# Patient Record
Sex: MALE | Race: WHITE | NOT HISPANIC OR LATINO | URBAN - METROPOLITAN AREA
[De-identification: names, ages, dates, MRNs, and addresses within clinical notes are randomized per-mention and may not be internally consistent; named-entity substitution may affect disease eponyms.]

---

## 2019-12-24 ENCOUNTER — OFFICE VISIT - HEALTHEAST (OUTPATIENT)
Dept: FAMILY MEDICINE | Facility: CLINIC | Age: 34
End: 2019-12-24

## 2019-12-24 DIAGNOSIS — Z00.00 ROUTINE GENERAL MEDICAL EXAMINATION AT A HEALTH CARE FACILITY: ICD-10-CM

## 2019-12-24 DIAGNOSIS — Z13.1 DIABETES MELLITUS SCREENING: ICD-10-CM

## 2019-12-24 DIAGNOSIS — E66.09 CLASS 2 OBESITY DUE TO EXCESS CALORIES WITHOUT SERIOUS COMORBIDITY WITH BODY MASS INDEX (BMI) OF 35.0 TO 35.9 IN ADULT: ICD-10-CM

## 2019-12-24 DIAGNOSIS — G44.82 ORGASMIC HEADACHE: ICD-10-CM

## 2019-12-24 DIAGNOSIS — L21.9 SEBORRHEIC DERMATITIS OF SCALP: ICD-10-CM

## 2019-12-24 DIAGNOSIS — Z13.220 LIPID SCREENING: ICD-10-CM

## 2019-12-24 DIAGNOSIS — E66.812 CLASS 2 OBESITY DUE TO EXCESS CALORIES WITHOUT SERIOUS COMORBIDITY WITH BODY MASS INDEX (BMI) OF 35.0 TO 35.9 IN ADULT: ICD-10-CM

## 2019-12-24 LAB
CHOLEST SERPL-MCNC: 197 MG/DL
FASTING STATUS PATIENT QL REPORTED: YES
FASTING STATUS PATIENT QL REPORTED: YES
GLUCOSE BLD-MCNC: 77 MG/DL (ref 70–125)
HDLC SERPL-MCNC: 39 MG/DL
LDLC SERPL CALC-MCNC: 129 MG/DL
TRIGL SERPL-MCNC: 146 MG/DL

## 2019-12-24 ASSESSMENT — MIFFLIN-ST. JEOR: SCORE: 2039.51

## 2020-01-22 ENCOUNTER — RECORDS - HEALTHEAST (OUTPATIENT)
Dept: ADMINISTRATIVE | Facility: OTHER | Age: 35
End: 2020-01-22

## 2020-01-30 ENCOUNTER — RECORDS - HEALTHEAST (OUTPATIENT)
Dept: ADMINISTRATIVE | Facility: OTHER | Age: 35
End: 2020-01-30

## 2020-01-31 ENCOUNTER — RECORDS - HEALTHEAST (OUTPATIENT)
Dept: ADMINISTRATIVE | Facility: OTHER | Age: 35
End: 2020-01-31

## 2020-02-24 ENCOUNTER — RECORDS - HEALTHEAST (OUTPATIENT)
Dept: ADMINISTRATIVE | Facility: OTHER | Age: 35
End: 2020-02-24

## 2020-04-21 ENCOUNTER — RECORDS - HEALTHEAST (OUTPATIENT)
Dept: ADMINISTRATIVE | Facility: OTHER | Age: 35
End: 2020-04-21

## 2020-08-25 ENCOUNTER — COMMUNICATION - HEALTHEAST (OUTPATIENT)
Dept: FAMILY MEDICINE | Facility: CLINIC | Age: 35
End: 2020-08-25

## 2020-08-27 ENCOUNTER — OFFICE VISIT - HEALTHEAST (OUTPATIENT)
Dept: FAMILY MEDICINE | Facility: CLINIC | Age: 35
End: 2020-08-27

## 2020-08-27 ENCOUNTER — RECORDS - HEALTHEAST (OUTPATIENT)
Dept: GENERAL RADIOLOGY | Facility: CLINIC | Age: 35
End: 2020-08-27

## 2020-08-27 DIAGNOSIS — M79.672 PAIN OF LEFT HEEL: ICD-10-CM

## 2020-08-27 DIAGNOSIS — E66.09 CLASS 1 OBESITY DUE TO EXCESS CALORIES WITHOUT SERIOUS COMORBIDITY WITH BODY MASS INDEX (BMI) OF 34.0 TO 34.9 IN ADULT: ICD-10-CM

## 2020-08-27 DIAGNOSIS — E66.811 CLASS 1 OBESITY DUE TO EXCESS CALORIES WITHOUT SERIOUS COMORBIDITY WITH BODY MASS INDEX (BMI) OF 34.0 TO 34.9 IN ADULT: ICD-10-CM

## 2020-08-27 DIAGNOSIS — M79.672 PAIN IN LEFT FOOT: ICD-10-CM

## 2020-08-27 RX ORDER — KETOCONAZOLE 20 MG/ML
SHAMPOO TOPICAL
Status: SHIPPED | COMMUNITY
Start: 2020-03-28 | End: 2022-05-19

## 2020-11-10 ENCOUNTER — COMMUNICATION - HEALTHEAST (OUTPATIENT)
Dept: FAMILY MEDICINE | Facility: CLINIC | Age: 35
End: 2020-11-10

## 2020-11-13 ENCOUNTER — OFFICE VISIT - HEALTHEAST (OUTPATIENT)
Dept: FAMILY MEDICINE | Facility: CLINIC | Age: 35
End: 2020-11-13

## 2020-11-13 DIAGNOSIS — R19.7 DIARRHEA, UNSPECIFIED TYPE: ICD-10-CM

## 2020-11-13 DIAGNOSIS — G44.82 ORGASMIC HEADACHE: ICD-10-CM

## 2020-11-13 DIAGNOSIS — Z20.822 SUSPECTED COVID-19 VIRUS INFECTION: ICD-10-CM

## 2020-11-13 DIAGNOSIS — M79.10 MYALGIA: ICD-10-CM

## 2020-11-13 RX ORDER — SUMATRIPTAN 25 MG/1
25 TABLET, FILM COATED ORAL
Qty: 10 TABLET | Refills: 3 | Status: SHIPPED | OUTPATIENT
Start: 2020-11-13 | End: 2022-05-19

## 2020-11-14 ENCOUNTER — AMBULATORY - HEALTHEAST (OUTPATIENT)
Dept: FAMILY MEDICINE | Facility: CLINIC | Age: 35
End: 2020-11-14

## 2020-11-14 DIAGNOSIS — Z20.822 SUSPECTED COVID-19 VIRUS INFECTION: ICD-10-CM

## 2020-11-14 DIAGNOSIS — M79.10 MYALGIA: ICD-10-CM

## 2020-11-14 DIAGNOSIS — R19.7 DIARRHEA, UNSPECIFIED TYPE: ICD-10-CM

## 2020-11-16 ENCOUNTER — COMMUNICATION - HEALTHEAST (OUTPATIENT)
Dept: SCHEDULING | Facility: CLINIC | Age: 35
End: 2020-11-16

## 2021-04-18 ENCOUNTER — COMMUNICATION - HEALTHEAST (OUTPATIENT)
Dept: FAMILY MEDICINE | Facility: CLINIC | Age: 36
End: 2021-04-18

## 2021-04-20 ENCOUNTER — RECORDS - HEALTHEAST (OUTPATIENT)
Dept: GENERAL RADIOLOGY | Facility: CLINIC | Age: 36
End: 2021-04-20

## 2021-04-20 ENCOUNTER — OFFICE VISIT - HEALTHEAST (OUTPATIENT)
Dept: FAMILY MEDICINE | Facility: CLINIC | Age: 36
End: 2021-04-20

## 2021-04-20 DIAGNOSIS — R00.2 PALPITATIONS: ICD-10-CM

## 2021-04-20 DIAGNOSIS — R06.02 SHORTNESS OF BREATH: ICD-10-CM

## 2021-04-20 DIAGNOSIS — R03.0 ELEVATED BLOOD PRESSURE READING: ICD-10-CM

## 2021-04-20 DIAGNOSIS — J30.9 ALLERGIC RHINITIS, UNSPECIFIED SEASONALITY, UNSPECIFIED TRIGGER: ICD-10-CM

## 2021-04-20 LAB
ANION GAP SERPL CALCULATED.3IONS-SCNC: 11 MMOL/L (ref 5–18)
BUN SERPL-MCNC: 10 MG/DL (ref 8–22)
CALCIUM SERPL-MCNC: 9.5 MG/DL (ref 8.5–10.5)
CHLORIDE BLD-SCNC: 105 MMOL/L (ref 98–107)
CO2 SERPL-SCNC: 24 MMOL/L (ref 22–31)
CREAT SERPL-MCNC: 0.99 MG/DL (ref 0.7–1.3)
D DIMER PPP FEU-MCNC: <=0.27 FEU UG/ML
ERYTHROCYTE [DISTWIDTH] IN BLOOD BY AUTOMATED COUNT: 12.6 % (ref 11–14.5)
GFR SERPL CREATININE-BSD FRML MDRD: >60 ML/MIN/1.73M2
GLUCOSE BLD-MCNC: 83 MG/DL (ref 70–125)
HCT VFR BLD AUTO: 42.4 % (ref 40–54)
HGB BLD-MCNC: 14.4 G/DL (ref 14–18)
MCH RBC QN AUTO: 30.3 PG (ref 27–34)
MCHC RBC AUTO-ENTMCNC: 34 G/DL (ref 32–36)
MCV RBC AUTO: 89 FL (ref 80–100)
PLATELET # BLD AUTO: 353 THOU/UL (ref 140–440)
PMV BLD AUTO: 8.9 FL (ref 7–10)
POTASSIUM BLD-SCNC: 4.3 MMOL/L (ref 3.5–5)
RBC # BLD AUTO: 4.76 MILL/UL (ref 4.4–6.2)
SODIUM SERPL-SCNC: 140 MMOL/L (ref 136–145)
TSH SERPL DL<=0.005 MIU/L-ACNC: 0.88 UIU/ML (ref 0.3–5)
WBC: 10.2 THOU/UL (ref 4–11)

## 2021-04-21 LAB
ATRIAL RATE - MUSE: 58 BPM
DIASTOLIC BLOOD PRESSURE - MUSE: NORMAL
INTERPRETATION ECG - MUSE: NORMAL
P AXIS - MUSE: 41 DEGREES
PR INTERVAL - MUSE: 168 MS
QRS DURATION - MUSE: 86 MS
QT - MUSE: 396 MS
QTC - MUSE: 388 MS
R AXIS - MUSE: 34 DEGREES
SYSTOLIC BLOOD PRESSURE - MUSE: NORMAL
T AXIS - MUSE: 24 DEGREES
VENTRICULAR RATE- MUSE: 58 BPM

## 2021-04-28 ENCOUNTER — OFFICE VISIT - HEALTHEAST (OUTPATIENT)
Dept: CARDIOLOGY | Facility: CLINIC | Age: 36
End: 2021-04-28

## 2021-04-28 DIAGNOSIS — R00.2 PALPITATIONS: ICD-10-CM

## 2021-04-28 ASSESSMENT — MIFFLIN-ST. JEOR: SCORE: 2024.88

## 2021-05-11 ENCOUNTER — OFFICE VISIT - HEALTHEAST (OUTPATIENT)
Dept: ALLERGY | Facility: CLINIC | Age: 36
End: 2021-05-11

## 2021-05-11 DIAGNOSIS — J30.81 ALLERGIC RHINITIS DUE TO ANIMALS: ICD-10-CM

## 2021-05-11 DIAGNOSIS — R10.84 ABDOMINAL PAIN, GENERALIZED: ICD-10-CM

## 2021-05-11 DIAGNOSIS — J30.1 SEASONAL ALLERGIC RHINITIS DUE TO POLLEN: ICD-10-CM

## 2021-05-11 ASSESSMENT — MIFFLIN-ST. JEOR: SCORE: 2024.88

## 2021-05-12 LAB
CAT DANDER IGG QN: <0.35 KU/L
DOG DANDER+EPITH IGE QN: <0.35 KU/L

## 2021-05-14 LAB
GLIADIN IGA SER-ACNC: 1.3 U/ML
GLIADIN IGG SER-ACNC: <0.4 U/ML
IGA SERPL-MCNC: 168 MG/DL (ref 65–400)
TTG IGA SER-ACNC: 0.3 U/ML
TTG IGG SER-ACNC: <0.6 U/ML

## 2021-05-24 ENCOUNTER — HOSPITAL ENCOUNTER (OUTPATIENT)
Dept: CARDIOLOGY | Facility: CLINIC | Age: 36
Discharge: HOME | End: 2021-05-24
Attending: INTERNAL MEDICINE

## 2021-05-24 LAB
AORTIC ROOT: 2.9 CM
AORTIC VALVE MEAN VELOCITY: 95.7 CM/S
ASCENDING AORTA: 2.8 CM
AV DIMENSIONLESS INDEX VTI: 0.8
AV MEAN GRADIENT: 4 MMHG
AV PEAK GRADIENT: 9.5 MMHG
AV VALVE AREA: 3.3 CM2
AV VELOCITY RATIO: 0.8
BSA FOR ECHO PROCEDURE: 2.32 M2
CV BLOOD PRESSURE: ABNORMAL MMHG
CV ECHO HEIGHT: 70 IN
CV ECHO WEIGHT: 240 LBS
DOP CALC AO PEAK VEL: 154 CM/S
DOP CALC AO VTI: 30 CM
DOP CALC LVOT AREA: 4.15 CM2
DOP CALC LVOT DIAMETER: 2.3 CM
DOP CALC LVOT PEAK VEL: 118 CM/S
DOP CALC LVOT STROKE VOLUME: 98.8 CM3
DOP CALCLVOT PEAK VEL VTI: 23.8 CM
EJECTION FRACTION: 72 % (ref 55–75)
FRACTIONAL SHORTENING: 39.8 % (ref 28–44)
INTERVENTRICULAR SEPTUM IN END DIASTOLE: 1.14 CM (ref 0.6–1)
IVS/PW RATIO: 1.3
LA AREA 1: 23.2 CM2
LA AREA 2: 23.7 CM2
LEFT ATRIUM LENGTH: 5.48 CM
LEFT ATRIUM SIZE: 3.1 CM
LEFT ATRIUM VOLUME INDEX: 36.8 ML/M2
LEFT ATRIUM VOLUME: 85.3 ML
LEFT VENTRICLE CARDIAC INDEX: 3.1 L/MIN/M2
LEFT VENTRICLE CARDIAC OUTPUT: 7.2 L/MIN
LEFT VENTRICLE DIASTOLIC VOLUME INDEX: 59.5 CM3/M2 (ref 34–74)
LEFT VENTRICLE DIASTOLIC VOLUME: 138 CM3 (ref 62–150)
LEFT VENTRICLE HEART RATE: 73 BPM
LEFT VENTRICLE MASS INDEX: 49 G/M2
LEFT VENTRICLE SYSTOLIC VOLUME INDEX: 16.4 CM3/M2 (ref 11–31)
LEFT VENTRICLE SYSTOLIC VOLUME: 38 CM3 (ref 21–61)
LEFT VENTRICULAR INTERNAL DIMENSION IN DIASTOLE: 3.74 CM (ref 4.2–5.8)
LEFT VENTRICULAR INTERNAL DIMENSION IN SYSTOLE: 2.25 CM (ref 2.5–4)
LEFT VENTRICULAR MASS: 113.8 G
LEFT VENTRICULAR OUTFLOW TRACT MEAN GRADIENT: 3 MMHG
LEFT VENTRICULAR OUTFLOW TRACT MEAN VELOCITY: 80 CM/S
LEFT VENTRICULAR OUTFLOW TRACT PEAK GRADIENT: 6 MMHG
LEFT VENTRICULAR POSTERIOR WALL IN END DIASTOLE: 0.85 CM (ref 0.6–1)
LV STROKE VOLUME INDEX: 42.6 ML/M2
MITRAL VALVE E/A RATIO: 1.8
MV AVERAGE E/E' RATIO: 7.5 CM/S
MV DECELERATION TIME: 211 MS
MV E'TISSUE VEL-LAT: 14.5 CM/S
MV E'TISSUE VEL-MED: 12.6 CM/S
MV LATERAL E/E' RATIO: 7
MV MEDIAL E/E' RATIO: 8.1
MV PEAK A VELOCITY: 55.3 CM/S
MV PEAK E VELOCITY: 102 CM/S
NUC REST DIASTOLIC VOLUME INDEX: 3840 LBS
NUC REST SYSTOLIC VOLUME INDEX: 70 IN
PV ACCELERATION TIME: 127 MS
TRICUSPID REGURGITATION PEAK PRESSURE GRADIENT: 19.9 MMHG
TRICUSPID VALVE ANULAR PLANE SYSTOLIC EXCURSION: 3.1 CM
TRICUSPID VALVE PEAK REGURGITANT VELOCITY: 223 CM/S

## 2021-05-24 ASSESSMENT — MIFFLIN-ST. JEOR: SCORE: 2024.88

## 2021-05-27 VITALS — HEART RATE: 72 BPM | OXYGEN SATURATION: 98 % | BODY MASS INDEX: 34.36 KG/M2 | HEIGHT: 70 IN | WEIGHT: 240 LBS

## 2021-06-04 VITALS
SYSTOLIC BLOOD PRESSURE: 130 MMHG | HEART RATE: 85 BPM | DIASTOLIC BLOOD PRESSURE: 80 MMHG | BODY MASS INDEX: 34.92 KG/M2 | TEMPERATURE: 98.2 F | WEIGHT: 243.38 LBS

## 2021-06-04 VITALS
SYSTOLIC BLOOD PRESSURE: 136 MMHG | BODY MASS INDEX: 34.95 KG/M2 | WEIGHT: 244.1 LBS | HEART RATE: 68 BPM | OXYGEN SATURATION: 97 % | DIASTOLIC BLOOD PRESSURE: 76 MMHG | HEIGHT: 70 IN

## 2021-06-04 NOTE — PROGRESS NOTES
Assessment and Plan:     1. Routine general medical examination at a health care facility  Discussed consuming a healthy diet and exercising.  Provided influenza and Td vaccines.  - Td, Preservative Free (green label)    2. Diabetes mellitus screening  - Glucose    3. Lipid screening  - Lipid Cascade    4. Class 2 obesity due to excess calories without serious comorbidity with body mass index (BMI) of 35.0 to 35.9 in adult  The following high BMI interventions were performed this visit: encouragement to exercise and lifestyle education regarding diet    5. Orgasmic headache  Patient had this evaluated in the emergency room in February.  He was told that he had a normal MRI.  Unfortunately, I do not have these records.  We will try to obtain records.  Will start sumatriptan to use as needed for possible migraine headaches.  Educated on its indications and side effects.  Will refer to neurology for further evaluation if symptoms persist.  - SUMAtriptan (IMITREX) 25 MG tablet; Take 1 tablet (25 mg total) by mouth every 2 (two) hours as needed for migraine.  Dispense: 10 tablet; Refill: 3  - Ambulatory referral to Neurology    6. Seborrheic dermatitis of scalp  We will treat with ketoconazole shampoo.  If no improvement symptoms, may consider referral to dermatology.  He is content with the plan.  - ketoconazole (NIZORAL) 2 % shampoo; Apply to damp skin, lather, leave on 5 minutes, and rinse  Dispense: 120 mL; Refill: 3        Subjective:     Ishmael is a 34 y.o. male presenting to the clinic for a male physical.  Patient has been  since October.  He has 2 children ages 11 and 9.  He has no concerns for STDs.  He is trying to consume a healthy diet.  He walks for exercise.  He works within security.  Patient has numerous concerns today.  Since February, he has had headaches after having an orgasm.  Patient states after his first episode, he developed left-sided facial numbness.  He presented to the emergency  room where he was diagnosed with a migraine headache.  He feels as though the left side of his face has been less sensitive since.  He did have an MRI at that time.  Unfortunately, I do not have these records available.  Patient states the headache has a gradual onset after having an orgasm.  He feels an ache behind his left eye which radiates to his left temple region.  He denies photophobia, nausea, vomiting, numbness and tingling of his extremities, muscular weakness.  He does take Aleve with some relief.  Secondly, patient is concerned dry scalp.  He has had this since adolescence.  He has tried numerous over-the-counter shampoos including head and shoulders and Selsun Blue. He has tried a T-cell shampoo with salicylic acid.  The dryness continues to be generalized.    Review of Systems: A complete 14 point review of systems was obtained and is negative or as stated in the history of present illness.    Social History     Socioeconomic History     Marital status:      Spouse name: Not on file     Number of children: Not on file     Years of education: Not on file     Highest education level: Not on file   Occupational History     Not on file   Social Needs     Financial resource strain: Not on file     Food insecurity:     Worry: Not on file     Inability: Not on file     Transportation needs:     Medical: Not on file     Non-medical: Not on file   Tobacco Use     Smoking status: Never Smoker     Smokeless tobacco: Never Used   Substance and Sexual Activity     Alcohol use: Not on file     Drug use: Not on file     Sexual activity: Not on file   Lifestyle     Physical activity:     Days per week: Not on file     Minutes per session: Not on file     Stress: Not on file   Relationships     Social connections:     Talks on phone: Not on file     Gets together: Not on file     Attends Buddhism service: Not on file     Active member of club or organization: Not on file     Attends meetings of clubs or  "organizations: Not on file     Relationship status: Not on file     Intimate partner violence:     Fear of current or ex partner: Not on file     Emotionally abused: Not on file     Physically abused: Not on file     Forced sexual activity: Not on file   Other Topics Concern     Not on file   Social History Narrative     Not on file       Active Ambulatory Problems     Diagnosis Date Noted     No Active Ambulatory Problems     Resolved Ambulatory Problems     Diagnosis Date Noted     No Resolved Ambulatory Problems     No Additional Past Medical History       No family history on file.    Objective:     /76   Pulse 68   Ht 5' 9.75\" (1.772 m)   Wt (!) 244 lb 1.6 oz (110.7 kg)   SpO2 97%   BMI 35.28 kg/m      General Appearance:    Alert, cooperative, no distress, appears stated age   Head:    Normocephalic, without obvious abnormality, atraumatic   Eyes:    PERRL, conjunctiva/corneas clear, EOM's intact, fundi     benign, both eyes        Ears:    Normal TM's and external ear canals, both ears   Nose:   Nares normal, septum midline, mucosa normal, no drainage    or sinus tenderness   Throat:   Lips, mucosa, and tongue normal; teeth and gums normal   Neck:   Supple, symmetrical, trachea midline, no adenopathy;        thyroid:  No enlargement/tenderness/nodules; no carotid    bruit or JVD   Back:     Symmetric, no curvature, ROM normal, no CVA tenderness   Lungs:     Clear to auscultation bilaterally, respirations unlabored   Chest wall:    No tenderness or deformity   Heart:    Regular rate and rhythm, S1 and S2 normal, no murmur, rub    or gallop   Abdomen:     Soft, non-tender, bowel sounds active all four quadrants,     no masses, no organomegaly   Genitalia:    Normal male without lesion, discharge or tenderness       Extremities:   Extremities normal, atraumatic, no cyanosis or edema   Pulses:   2+ and symmetric all extremities   Skin:   His scalp has some areas of redness within the occipital region.  " He has some generalized flaky skin of the scalp.    Lymph nodes:   Cervical, supraclavicular, and axillary nodes normal   Neurologic:   CNII-XII intact. Normal strength, sensation and reflexes       throughout

## 2021-06-05 VITALS
DIASTOLIC BLOOD PRESSURE: 90 MMHG | BODY MASS INDEX: 34.49 KG/M2 | OXYGEN SATURATION: 96 % | WEIGHT: 240.4 LBS | HEART RATE: 67 BPM | SYSTOLIC BLOOD PRESSURE: 130 MMHG

## 2021-06-05 VITALS
SYSTOLIC BLOOD PRESSURE: 118 MMHG | DIASTOLIC BLOOD PRESSURE: 84 MMHG | HEIGHT: 70 IN | HEART RATE: 64 BPM | RESPIRATION RATE: 14 BRPM | WEIGHT: 240 LBS | BODY MASS INDEX: 34.36 KG/M2

## 2021-06-10 NOTE — PROGRESS NOTES
"Assessment/Plan:    1. Pain of left heel  Acute pain in L heel, no injury/trauma. Pain is significant, point tenderness on exam. Recommend further evaluation with xray and pt agrees - per mine and radiologist read no evidence of acute fracture or other pathology to cause symptoms. We discussed the following for management: wear supportive shoe at all times, ice to area, tylenol 1000mg three times a day and/or ibuprofen 600mg three times a day with meals as needed, follow up as needed.   - XR Foot Left 3 or More VWS; Future    2. Class 1 obesity due to excess calories without serious comorbidity with body mass index (BMI) of 34.0 to 34.9 in adult  BMI 34.92 today. Discussed healthy diet and regular exercise for weight loss.      Follow up: as needed    Missy Frazier MD  [facility]    Subjective:    Patient ID: Ishmael Petersen is a 35 y.o. male is here today for left heel pain    Left heel pain  -2 Saturdays ago he came home from work (works with [employer] at [place], on feet and moving around a lot) and felt like had \"deep bruise\" (had similar sx in the past from standing on hard surface) - didn't think much of it  -the next day it worsened and more painful  -couldn't think of any particular injury  -had a few days off and took it easy but continued to feel worse  -can't walk on heel - walking toe point   -if hits on things then it hurts really bad, almost \"takes him out\"  -\"feels like stepping on nail\"  -no skin changes or redness  -feels swollen but no visual changes  -no prior injury to this area  -hasn't taken anything for pain  -better at rest but worse with weight bearing  -called in sick on Sunday 8/23  -pain with putting on work shoes; sandals feel better than barefoot and work shoes feel better (built in insoles and Dr Zarate gel)  -reports hx injury in 8th grade and had surgery - L knee, chronic pain and arthritis      Past Medical History:   Diagnosis Date     Arthritis      Depression "      Sleep apnea      Past Surgical History:   Procedure Laterality Date     KNEE ARTHROSCOPY Left      VASECTOMY       Current Outpatient Medications on File Prior to Visit   Medication Sig Dispense Refill     cetirizine 10 mg TbDL Take by mouth.       ketoconazole (NIZORAL) 2 % shampoo APPLY TO DAMP SKIN, LATHER, LEAVE ON 5 MINUTES, AND RINSE       loratadine-pseudoephedrine (LORATADINE-PSEUDOEPHEDRINE) 5-120 mg Tb12 Take 1 tablet by mouth every 12 (twelve) hours.       SUMAtriptan (IMITREX) 25 MG tablet Take 1 tablet (25 mg total) by mouth every 2 (two) hours as needed for migraine. 10 tablet 3     No current facility-administered medications on file prior to visit.      No Known Allergies  Social History     Socioeconomic History     Marital status:      Spouse name: Not on file     Number of children: Not on file     Years of education: Not on file     Highest education level: Not on file   Occupational History     Not on file   Social Needs     Financial resource strain: Not on file     Food insecurity     Worry: Not on file     Inability: Not on file     Transportation needs     Medical: Not on file     Non-medical: Not on file   Tobacco Use     Smoking status: Never Smoker     Smokeless tobacco: Never Used   Substance and Sexual Activity     Alcohol use: Yes     Alcohol/week: 1.0 standard drinks     Types: 1 Shots of liquor per week     Drug use: Never     Sexual activity: Yes     Partners: Female     Comment:    Lifestyle     Physical activity     Days per week: Not on file     Minutes per session: Not on file     Stress: Not on file   Relationships     Social connections     Talks on phone: Not on file     Gets together: Not on file     Attends Mu-ism service: Not on file     Active member of club or organization: Not on file     Attends meetings of clubs or organizations: Not on file     Relationship status: Not on file     Intimate partner violence     Fear of current or ex partner: Not  on file     Emotionally abused: Not on file     Physically abused: Not on file     Forced sexual activity: Not on file   Other Topics Concern     Not on file   Social History Narrative     Not on file     Family History   Problem Relation Age of Onset     Hypertension Mother      Diabetes Brother         type 1 diabetes     Seizures Daughter      Diabetes Paternal Grandmother      Review of systems is as stated in HPI, and the remainder of system review is otherwise negative.    Objective:      /80   Pulse 85   Temp 98.2  F (36.8  C)   Wt (!) 243 lb 6 oz (110.4 kg)   BMI 34.92 kg/m      General appearance: awake, NAD, obese  HEENT: atraumatic, normocephalic, no scleral icterus or injection  Lungs: breathing comfortably on room air  Extremities: no LE edema bilaterally, moving all extremities  L foot: normal in appearance and color, no skin changes, no swelling, point tenderness on bottom of heel (no evidence of injury/trauma/puncture)  Neuro: alert, oriented x3, CNs grossly intact, no focal deficits appreciated  Psych: normal mood/affect/behavior, answering questions appropriately, linear thought process

## 2021-06-13 NOTE — PROGRESS NOTES
"Ishmael Petersen is a 35 y.o. male who is being evaluated via a billable telephone visit.      The patient has been notified of following:     \"This telephone visit will be conducted via a call between you and your physician/provider. We have found that certain health care needs can be provided without the need for a physical exam.  This service lets us provide the care you need with a short phone conversation.  If a prescription is necessary we can send it directly to your pharmacy.  If lab work is needed we can place an order for that and you can then stop by our lab to have the test done at a later time.    Telephone visits are billed at different rates depending on your insurance coverage. During this emergency period, for some insurers they may be billed the same as an in-person visit.  Please reach out to your insurance provider with any questions.    If during the course of the call the physician/provider feels a telephone visit is not appropriate, you will not be charged for this service.\"    Patient has given verbal consent to a Telephone visit? Yes    What phone number would you like to be contacted at? [phone]    Patient would like to receive their AVS by AVS Preference: [place].    Additional provider notes:   Assessment and Plan:     1. Suspected COVID-19 virus infection  Symptomatic COVID-19 Virus (CORONAVIRUS) PCR   2. Myalgia  Symptomatic COVID-19 Virus (CORONAVIRUS) PCR   3. Diarrhea, unspecified type  Symptomatic COVID-19 Virus (CORONAVIRUS) PCR   4. Orgasmic headache  SUMAtriptan (IMITREX) 25 MG tablet     Will obtain Covid testing to rule out Covid.  Patient may have also had viral gastroenteritis and has residual dehydration.  Discussed the importance of increasing fluid intake.  If he develops shortness of breath, chest tightness, wheezing, suggest ER evaluation.  Recommend quarantine until Covid test returns.  I renewed patient's sumatriptan to take as needed for headaches.  He is " content with the plan.    Subjective:     Ishmael is a 35 y.o. male presenting for a telephone visit.  Patient states last week, he had intermittent diarrhea from Tuesday through Saturday.  This past Tuesday, he woke up with fatigue and body aches.  He had difficulty walking yesterday because he felt as though his legs were going to give out.  He continues to feel achy today.  He denies rhinorrhea, postnasal drainage, sinus congestion, cough, fever, shortness of breath, chest tightness, wheezing.  He denies loss of sense of smell or taste.  He complains of lack of appetite.  His daughter had vomiting and diarrhea on Sunday.  She was tested for Covid on Tuesday and they are waiting for the results.  [place]  Patient requests refill of sumatriptan.  He takes this as needed for headaches that he develops after experiencing orgasms.  This works well for him.    Review of Systems: A complete 14 point review of systems was obtained and is negative or as stated in the history of present illness.    Social History     Socioeconomic History     Marital status:      Spouse name: Not on file     Number of children: Not on file     Years of education: Not on file     Highest education level: Not on file   Occupational History     Not on file   Social Needs     Financial resource strain: Not on file     Food insecurity     Worry: Not on file     Inability: Not on file     Transportation needs     Medical: Not on file     Non-medical: Not on file   Tobacco Use     Smoking status: Never Smoker     Smokeless tobacco: Never Used   Substance and Sexual Activity     Alcohol use: Yes     Alcohol/week: 1.0 standard drinks     Types: 1 Shots of liquor per week     Drug use: Never     Sexual activity: Yes     Partners: Female     Comment:    Lifestyle     Physical activity     Days per week: Not on file     Minutes per session: Not on file     Stress: Not on file   Relationships     Social  connections     Talks on phone: Not on file     Gets together: Not on file     Attends Baptism service: Not on file     Active member of club or organization: Not on file     Attends meetings of clubs or organizations: Not on file     Relationship status: Not on file     Intimate partner violence     Fear of current or ex partner: Not on file     Emotionally abused: Not on file     Physically abused: Not on file     Forced sexual activity: Not on file   Other Topics Concern     Not on file   Social History Narrative     Not on file       Active Ambulatory Problems     Diagnosis Date Noted     No Active Ambulatory Problems     Resolved Ambulatory Problems     Diagnosis Date Noted     No Resolved Ambulatory Problems     Past Medical History:   Diagnosis Date     Arthritis      Depression      Sleep apnea        Family History   Problem Relation Age of Onset     Hypertension Mother      Diabetes Brother         type 1 diabetes     Seizures Daughter      Diabetes Paternal Grandmother        Objective:     There were no vitals taken for this visit.    Patient is alert and is speaking clearly.  He does not sound short of breath or have a cough.    Phone call duration:  6 minutes    Jessica Farrar CNP

## 2021-06-16 PROBLEM — J30.9 ALLERGIC RHINITIS, UNSPECIFIED SEASONALITY, UNSPECIFIED TRIGGER: Status: ACTIVE | Noted: 2021-04-20

## 2021-06-16 NOTE — PROGRESS NOTES
Assessment and Plan:     1. Palpitations  Electrocardiogram Perform - Clinic    HM2(CBC w/o Differential)    Basic Metabolic Panel    Thyroid Cascade    D-dimer, Quantitative    Ambulatory referral to Cardiology - [facility]   2. Shortness of breath  XR Chest 2 Views    D-dimer, Quantitative    Ambulatory referral to Cardiology - [facility]   3. Allergic rhinitis, unspecified seasonality, unspecified trigger  Ambulatory referral to Allergy - [facility]   4. Elevated blood pressure reading       Differentials for palpitation and shortness of breath include acute coronary syndrome, PE, asthma, pneumonia, COVID-19, SVT, atrial fibrillation.EKG shows no acute changes.  Will have cardiology review.  Chest x-ray shows no cardiomegaly or infiltrate.  Will have radiology review.  We will also rule out anemia, electrolyte imbalance, thyroid disease as underlying causes of palpitations.  Will obtain D-dimer to rule out PE.  If elevated, will obtain chest CT. Will refer to cardiology for possible Holter or event monitoring and stress test.  His blood pressure is mildly elevated today.  I encouraged him to monitor his blood pressure at home.  He does take Claritin-D regularly which may be contributing to palpitations and elevated blood pressure.  I recommend he can discontinue this.  Will refer to an allergist for further evaluation and treatment of his allergic rhinitis.  He is to follow-up if symptoms persist or worsen.  He is content with the plan.    Subjective:     Ishmael is a 35 y.o. male presenting to the clinic for concerns for an episode of palpitations and shortness of breath.  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  On 3/31/2021, he was carrying a pedestal sink up the stairs to the garage when he developed shortness of breath, chest tightness, and tachycardia.  Patient had difficulty removing his gloves and N95 mask.  Patient stayed in the cold garage for 45 to 50 minutes trying to  catch his breath.  After the episode occurred, he experienced fatigue.  He does not smoke.  He denies recent travel.  He denies any family history of blood clots.  He did not take any over-the-counter products for his symptoms.  He does consume 1 to 2 cups of coffee daily.  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  He did receive his first Pfizer vaccine and is due for a second vaccine on Thursday.  He has a history of allergic rhinitis and generalized pruritus.  He received allergy injections in the past.  He is currently taking Claritin-D.  Patient noticed a bruise on his right calf 2 to 3 days ago.  It is not painful.  He has no difficulty ambulating.    Review of Systems: A complete 14 point review of systems was obtained and is negative or as stated in the history of present illness.    Social History     Socioeconomic History     Marital status:      Spouse name: Not on file     Number of children: Not on file     Years of education: Not on file     Highest education level: Not on file   Occupational History     Not on file   Social Needs     Financial resource strain: Not on file     Food insecurity     Worry: Not on file     Inability: Not on file     Transportation needs     Medical: Not on file     Non-medical: Not on file   Tobacco Use     Smoking status: Never Smoker     Smokeless tobacco: Never Used   Substance and Sexual Activity     Alcohol use: Yes     Alcohol/week: 1.0 standard drinks     Types: 1 Shots of liquor per week     Drug use: Never     Sexual activity: Yes     Partners: Female     Comment:    Lifestyle     Physical activity     Days per week: Not on file     Minutes per session: Not on file     Stress: Not on file   Relationships     Social connections     Talks on phone: Not on file     Gets together: Not on file     Attends Mosque service: Not on file     Active member of club or organization: Not on file     Attends meetings of clubs or organizations: Not on  file     Relationship status: Not on file     Intimate partner violence     Fear of current or ex partner: Not on file     Emotionally abused: Not on file     Physically abused: Not on file     Forced sexual activity: Not on file   Other Topics Concern     Not on file   Social History Narrative     Not on file       Active Ambulatory Problems     Diagnosis Date Noted     No Active Ambulatory Problems     Resolved Ambulatory Problems     Diagnosis Date Noted     No Resolved Ambulatory Problems     Past Medical History:   Diagnosis Date     Arthritis      Depression      Sleep apnea        Family History   Problem Relation Age of Onset     Hypertension Mother      Diabetes Brother         type 1 diabetes     Seizures Daughter      Diabetes Paternal Grandmother        Objective:     /90   Pulse 67   Wt (!) 240 lb 6.4 oz (109 kg)   SpO2 96%   BMI 34.49 kg/m      Patient is alert, in no obvious distress.   Skin: Warm, dry.    Neck: Supple, no lymphadenopathy, JVD, bruits noted.  No thyromegaly.  Lungs:  Clear to auscultation. Respirations even and unlabored.  No wheezing or rales noted.   Heart:  Regular rate and rhythm.  No murmurs, S3, S4, gallops, or rubs.    Abdomen: Soft, nontender.  No organomegaly. Bowel sounds normoactive. No guarding or masses noted.   Musculoskeletal: No edema is present in bilateral lower extremities.  He has a quarter size bruise noted within his right mid calf.  It is nontender to palpation.      LABORATORY: I ordered and personally reviewed an EKG showing sinus bradycardia at 58 bpm.    I ordered and personally reviewed chest x-rays showing no infiltrate or cardiomegaly.  Will have radiology review.

## 2021-06-17 NOTE — PROGRESS NOTES
"        Subjective   Ishmael Petersen is 35 y.o. and presents today for the following health issues   HPI chief complaint: Allergies    History of present illness: This is a pleasant 35-year-old gentleman who I was asked to see for evaluation by Jessica Farrar in regards to allergies.  Patient states he has a history of environmental allergies and was on allergy shots in 2009 and 2010.  This was done in [place].  He states he had a systemic reaction and stopped allergy shots.  He states he had a systemic reaction to allergy skin testing that required epinephrine as well.  He was not tested for animals at that time and would like to know if he is allergic to cat or dog.  He states that his ex has catheter house and when his children come to his house he notes some itching.  He previously was on Nasacort but states that cetirizine seems to control the rest of his environmental allergies.  No history of asthma.  No wheezing or shortness of breath attributed to asthma.  He is currently being worked up for cardiac etiology of shortness of breath.      He does wonder if he is gluten sensitive.  No hives, swelling or shortness of breath after eating gluten.  He does note increased stomach distention and abdominal pain after eating gluten-containing products he is trying to decrease the amount of gluten in his diet.      Past medical history: Otherwise unremarkable    Social history: He has no animals in his home, lives in a home with central and basement, non-smoking environment, non-smoker    Family history: Son with asthma    Review of Systems  Review of Systems performed as above and the remainder is negative.      Objective    Pulse 72   Ht 5' 10\" (1.778 m)   Wt (!) 240 lb (108.9 kg)   SpO2 98%   BMI 34.44 kg/m    Body mass index is 34.44 kg/m .  Physical Exam  Gen: Pleasant male not in acute distress  HEENT: Eyes no erythema of the bulbar or palpebral conjunctiva, no edema. Ears: No deformities or lesions " nose: No congestion, . Mouth: Throat clear,   Neck: No masses lesions or swelling  Respiratory: Speaking in full sentences, no retractions.  No coughing with talking  Lymph: No visible supraclavicular or cervical lymphadenopathy  Skin: No rashes or lesions  Psych: Alert and oriented times 3        Impression report and plan:  1.  Allergic rhinitis to animals  2.  Allergic rhinitis to pollen  3.  Abdominal pain    Check celiac disease panel.  Check specific IgE to cat and dog.  Briefly reviewed environmental control with him.  I would recommend further measures regarding the animals pending his test results.  He states he was positive previously to dust mites and pollen.  It seems that his allergy symptoms are well controlled with daily cetirizine.  He will notify me if that is not the case.  I will follow up with the patient once testing returns.

## 2021-06-21 ENCOUNTER — COMMUNICATION - HEALTHEAST (OUTPATIENT)
Dept: FAMILY MEDICINE | Facility: CLINIC | Age: 36
End: 2021-06-21

## 2021-06-21 DIAGNOSIS — B35.0 TINEA BARBAE AND TINEA CAPITIS: ICD-10-CM

## 2021-06-21 NOTE — LETTER
Letter by Michelle Ferris MD at      Author: Michelle Ferris MD Service: -- Author Type: --    Filed:  Encounter Date: 5/11/2021 Status: (Other)         May 11, 2021     Jessica Farrar CNP  [address]  [address]  [facility] [address]    Patient: Ishmael Petersen   MR Number: [id number]   YOB: 1985   Date of Visit: 5/11/2021     Dear Ms. Charan CNP:    Thank you for referring Ishmael Petersen to me for evaluation. Below are the relevant portions of my assessment and plan of care.    If you have questions, please do not hesitate to call me. I look forward to following Ishmael along with you.    Sincerely,        Michelle Ferris MD        CC  No Recipients    Progress Notes:        Subjective   Ishmael Petersen is 35 y.o. and presents today for the following health issues   HPI chief complaint: Allergies    History of present illness: This is a pleasant 35-year-old gentleman who I was asked to see for evaluation by Jessica Farrar in regards to allergies.  Patient states he has a history of environmental allergies and was on allergy shots in 2009 and 2010.  This was done in [place].  He states he had a systemic reaction and stopped allergy shots.  He states he had a systemic reaction to allergy skin testing that required epinephrine as well.  He was not tested for animals at that time and would like to know if he is allergic to cat or dog.  He states that his ex has catheter house and when his children come to his house he notes some itching.  He previously was on Nasacort but states that cetirizine seems to control the rest of his environmental allergies.  No history of asthma.  No wheezing or shortness of breath attributed to asthma.  He is currently being worked up for cardiac etiology of shortness of breath.      He does wonder if he is gluten sensitive.  No hives, swelling or shortness of breath after eating gluten.  He does note increased stomach distention and  "abdominal pain after eating gluten-containing products he is trying to decrease the amount of gluten in his diet.      Past medical history: Otherwise unremarkable    Social history: He has no animals in his home, lives in a home with central and basement, non-smoking environment, non-smoker    Family history: Son with asthma    Review of Systems  Review of Systems performed as above and the remainder is negative.      Objective    Pulse 72   Ht 5' 10\" (1.778 m)   Wt (!) 240 lb (108.9 kg)   SpO2 98%   BMI 34.44 kg/m    Body mass index is 34.44 kg/m .  Physical Exam  Gen: Pleasant male not in acute distress  HEENT: Eyes no erythema of the bulbar or palpebral conjunctiva, no edema. Ears: No deformities or lesions nose: No congestion, . Mouth: Throat clear,   Neck: No masses lesions or swelling  Respiratory: Speaking in full sentences, no retractions.  No coughing with talking  Lymph: No visible supraclavicular or cervical lymphadenopathy  Skin: No rashes or lesions  Psych: Alert and oriented times 3        Impression report and plan:  1.  Allergic rhinitis to animals  2.  Allergic rhinitis to pollen  3.  Abdominal pain    Check celiac disease panel.  Check specific IgE to cat and dog.  Briefly reviewed environmental control with him.  I would recommend further measures regarding the animals pending his test results.  He states he was positive previously to dust mites and pollen.  It seems that his allergy symptoms are well controlled with daily cetirizine.  He will notify me if that is not the case.  I will follow up with the patient once testing returns.             "

## 2021-06-22 RX ORDER — KETOCONAZOLE 20 MG/ML
SHAMPOO TOPICAL
Qty: 120 ML | Refills: 3 | Status: SHIPPED | OUTPATIENT
Start: 2021-06-22 | End: 2022-05-19

## 2021-06-26 ENCOUNTER — HEALTH MAINTENANCE LETTER (OUTPATIENT)
Age: 36
End: 2021-06-26

## 2021-06-26 NOTE — TELEPHONE ENCOUNTER
RN cannot approve Refill Request    RN can NOT refill this medication Protocol failed and NO refill given. Last office visit: 4/20/2021 Jessica Farrar CNP Last Physical: 12/24/2019 Last MTM visit: Visit date not found Last visit same specialty: 4/20/2021 Jessica Farrar CNP.  Next visit within 3 mo: Visit date not found  Next physical within 3 mo: Visit date not found      Cinthia Callaway, [facility] Triage/Med Refill 6/21/2021    Requested Prescriptions   Pending Prescriptions Disp Refills     ketoconazole (NIZORAL) 2 % shampoo [Pharmacy Med Name: KETOCONAZOLE 2% SHAMPOO] 120 mL 3     Sig: APPLY TO DAMP SKIN, LATHER, LEAVE ON 5 MINUTES, AND RINSE       There is no refill protocol information for this order

## 2021-06-30 NOTE — PROGRESS NOTES
Progress Notes by Natty Sorto MD at 4/28/2021 10:30 AM     Author: Natty Sorto MD Service: -- Author Type: Physician    Filed: 4/28/2021 10:56 AM Encounter Date: 4/28/2021 Status: Signed    : Natty Sorto MD (Physician)           Thank you, Dr. Farrar, for asking us to see Ishmael Petersen at the [facility].      Assessment/Recommendations   Assessment:    1.  Palpitations: Episode of palpitations lasting for 45 minutes with associated shortness of breath  2.  History of heart murmur    Plan:  1.  Echocardiogram to evaluate for structural heart disease  2.  30-day RANDOLPH monitor to evaluate further for arrhythmias  3.  If above is unremarkable may follow-up as needed       History of Present Illness    Mr. Ishmael Petersen is a 35 y.o. male with history of allergies otherwise no significant history who I am seeing today for initial consultation after recent presentation to the ED with complaints of palpitations and shortness of breath.  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  He started carrying a pedestal sink and suddenly felt like he could not breathe.  His chest felt restricted.  He tried to get his gloves off and had trouble.  He took off his mask and went to the garage and it took 45 minutes before he started feeling better.  He went to the ED and electrocardiogram was reviewed which was unremarkable.  D-dimer and labs including thyroid are unremarkable as well.  He has not had any recurrent episodes.  During his episode he did not have any chest pain but he did feel weakness and decreased energy.  He also felt his heart racing very fast throughout the episode.  No regularity chest heart racing.  He walks a lot and does not notice any exertional symptoms of chest pain or breathing difficulty.       Physical Examination Review of Systems   Vitals:    04/28/21 1024   BP: 118/84   Pulse: 64   Resp: 14     Body mass  index is 34.44 kg/m .  Wt Readings from Last 3 Encounters:   04/28/21 (!) 240 lb (108.9 kg)   04/20/21 (!) 240 lb 6.4 oz (109 kg)   08/27/20 (!) 243 lb 6 oz (110.4 kg)       General Appearance:   alert, no apparent distress   HEENT:  no scleral icterus; the mucous membranes are pink and moist                                  Neck: No jvd   Chest: the spine is straight and the chest is symmetric   Lungs:   respirations unlabored; the lungs are clear to auscultation   Cardiovascular:   regular rhythm with normal first and second heart sounds and no murmurs or gallops   Abdomen:  no organomegaly, masses, bruits, or tenderness; bowel sounds are present   Extremities: no edema   Skin: no xanthelasma    General: WNL  Eyes: WNL  Ears/Nose/Throat: WNL  Lungs: Shortness of Breath  Heart: Shortness of Breath with activity  Stomach: Nausea  Bladder: WNL  Muscle/Joints: Joint Pain, Muscle Weakness  Skin: WNL  Nervous System: WNL  Mental Health: Anxiety     Blood: WNL     Medical History  Surgical History Family History Social History   Past Medical History:   Diagnosis Date   ? Arthritis    ? Depression    ? Sleep apnea     Past Surgical History:   Procedure Laterality Date   ? KNEE ARTHROSCOPY Left    ? VASECTOMY      Family History   Problem Relation Age of Onset   ? Hypertension Mother    ? Diabetes Brother         type 1 diabetes   ? Seizures Daughter    ? Diabetes Paternal Grandmother     Social History     Socioeconomic History   ? Marital status:      Spouse name: Not on file   ? Number of children: Not on file   ? Years of education: Not on file   ? Highest education level: Not on file   Occupational History   ? Not on file   Social Needs   ? Financial resource strain: Not on file   ? Food insecurity     Worry: Not on file     Inability: Not on file   ? Transportation needs     Medical: Not on file     Non-medical: Not on file   Tobacco Use   ? Smoking status: Never Smoker   ? Smokeless tobacco: Never Used    Substance and Sexual Activity   ? Alcohol use: Yes     Alcohol/week: 1.0 standard drinks     Types: 1 Shots of liquor per week   ? Drug use: Never   ? Sexual activity: Yes     Partners: Female     Comment:    Lifestyle   ? Physical activity     Days per week: Not on file     Minutes per session: Not on file   ? Stress: Not on file   Relationships   ? Social connections     Talks on phone: Not on file     Gets together: Not on file     Attends Jainism service: Not on file     Active member of club or organization: Not on file     Attends meetings of clubs or organizations: Not on file     Relationship status: Not on file   ? Intimate partner violence     Fear of current or ex partner: Not on file     Emotionally abused: Not on file     Physically abused: Not on file     Forced sexual activity: Not on file   Other Topics Concern   ? Not on file   Social History Narrative   ? Not on file          Medications  Allergies   Current Outpatient Medications   Medication Sig Dispense Refill   ? cetirizine 10 mg TbDL Take by mouth.     ? ketoconazole (NIZORAL) 2 % shampoo APPLY TO DAMP SKIN, LATHER, LEAVE ON 5 MINUTES, AND RINSE     ? SUMAtriptan (IMITREX) 25 MG tablet Take 1 tablet (25 mg total) by mouth every 2 (two) hours as needed for migraine. 10 tablet 3   ? loratadine-pseudoephedrine (LORATADINE-PSEUDOEPHEDRINE) 5-120 mg Tb12 Take 1 tablet by mouth every 12 (twelve) hours.       No current facility-administered medications for this visit.       No Known Allergies      Lab Results    Chemistry/lipid CBC Cardiac Enzymes/BNP/TSH/INR   Lab Results   Component Value Date    CHOL 197 12/24/2019    HDL 39 (L) 12/24/2019    LDLCALC 129 12/24/2019    TRIG 146 12/24/2019    CREATININE 0.99 04/20/2021    BUN 10 04/20/2021    K 4.3 04/20/2021     04/20/2021     04/20/2021    CO2 24 04/20/2021    Lab Results   Component Value Date    WBC 10.2 04/20/2021    HGB 14.4 04/20/2021    HCT 42.4 04/20/2021    MCV 89  04/20/2021     04/20/2021    Lab Results   Component Value Date    TSH 0.88 04/20/2021    INR 1.09 02/21/2019

## 2021-07-06 VITALS — HEIGHT: 70 IN | WEIGHT: 240 LBS | BODY MASS INDEX: 34.36 KG/M2

## 2021-10-16 ENCOUNTER — HEALTH MAINTENANCE LETTER (OUTPATIENT)
Age: 36
End: 2021-10-16

## 2022-04-29 ENCOUNTER — MYC MEDICAL ADVICE (OUTPATIENT)
Dept: FAMILY MEDICINE | Facility: CLINIC | Age: 37
End: 2022-04-29
Payer: COMMERCIAL

## 2022-05-19 ENCOUNTER — OFFICE VISIT (OUTPATIENT)
Dept: FAMILY MEDICINE | Facility: CLINIC | Age: 37
End: 2022-05-19
Payer: COMMERCIAL

## 2022-05-19 VITALS
OXYGEN SATURATION: 94 % | WEIGHT: 242.2 LBS | HEART RATE: 69 BPM | SYSTOLIC BLOOD PRESSURE: 132 MMHG | TEMPERATURE: 97.4 F | DIASTOLIC BLOOD PRESSURE: 80 MMHG | BODY MASS INDEX: 34.67 KG/M2 | RESPIRATION RATE: 16 BRPM | HEIGHT: 70 IN

## 2022-05-19 DIAGNOSIS — G44.82 ORGASMIC HEADACHE: ICD-10-CM

## 2022-05-19 DIAGNOSIS — J30.9 ALLERGIC RHINITIS, UNSPECIFIED SEASONALITY, UNSPECIFIED TRIGGER: ICD-10-CM

## 2022-05-19 DIAGNOSIS — E66.01 CLASS 2 SEVERE OBESITY DUE TO EXCESS CALORIES WITH SERIOUS COMORBIDITY AND BODY MASS INDEX (BMI) OF 35.0 TO 35.9 IN ADULT (H): ICD-10-CM

## 2022-05-19 DIAGNOSIS — L21.9 SEBORRHEIC DERMATITIS: ICD-10-CM

## 2022-05-19 DIAGNOSIS — G89.29 CHRONIC BILATERAL THORACIC BACK PAIN: ICD-10-CM

## 2022-05-19 DIAGNOSIS — Z13.220 LIPID SCREENING: ICD-10-CM

## 2022-05-19 DIAGNOSIS — M54.6 CHRONIC BILATERAL THORACIC BACK PAIN: ICD-10-CM

## 2022-05-19 DIAGNOSIS — E66.812 CLASS 2 SEVERE OBESITY DUE TO EXCESS CALORIES WITH SERIOUS COMORBIDITY AND BODY MASS INDEX (BMI) OF 35.0 TO 35.9 IN ADULT (H): ICD-10-CM

## 2022-05-19 DIAGNOSIS — Z79.899 MEDICATION MANAGEMENT: ICD-10-CM

## 2022-05-19 DIAGNOSIS — G47.33 OSA (OBSTRUCTIVE SLEEP APNEA): ICD-10-CM

## 2022-05-19 DIAGNOSIS — Z00.00 ENCOUNTER FOR ROUTINE HISTORY AND PHYSICAL EXAM FOR MALE: Primary | ICD-10-CM

## 2022-05-19 LAB
ANION GAP SERPL CALCULATED.3IONS-SCNC: 9 MMOL/L (ref 5–18)
BUN SERPL-MCNC: 10 MG/DL (ref 8–22)
CALCIUM SERPL-MCNC: 9.7 MG/DL (ref 8.5–10.5)
CHLORIDE BLD-SCNC: 104 MMOL/L (ref 98–107)
CHOLEST SERPL-MCNC: 210 MG/DL
CO2 SERPL-SCNC: 28 MMOL/L (ref 22–31)
CREAT SERPL-MCNC: 0.89 MG/DL (ref 0.7–1.3)
FASTING STATUS PATIENT QL REPORTED: YES
GFR SERPL CREATININE-BSD FRML MDRD: >90 ML/MIN/1.73M2
GLUCOSE BLD-MCNC: 80 MG/DL (ref 70–125)
HDLC SERPL-MCNC: 35 MG/DL
LDLC SERPL CALC-MCNC: 143 MG/DL
POTASSIUM BLD-SCNC: 4.2 MMOL/L (ref 3.5–5)
SODIUM SERPL-SCNC: 141 MMOL/L (ref 136–145)
TRIGL SERPL-MCNC: 158 MG/DL

## 2022-05-19 PROCEDURE — 99213 OFFICE O/P EST LOW 20 MIN: CPT | Mod: 25 | Performed by: NURSE PRACTITIONER

## 2022-05-19 PROCEDURE — 36415 COLL VENOUS BLD VENIPUNCTURE: CPT | Performed by: NURSE PRACTITIONER

## 2022-05-19 PROCEDURE — 80048 BASIC METABOLIC PNL TOTAL CA: CPT | Performed by: NURSE PRACTITIONER

## 2022-05-19 PROCEDURE — 99395 PREV VISIT EST AGE 18-39: CPT | Performed by: NURSE PRACTITIONER

## 2022-05-19 PROCEDURE — 80061 LIPID PANEL: CPT | Performed by: NURSE PRACTITIONER

## 2022-05-19 RX ORDER — KETOCONAZOLE 20 MG/ML
SHAMPOO TOPICAL
Qty: 120 ML | Refills: 3 | Status: SHIPPED | OUTPATIENT
Start: 2022-05-19

## 2022-05-19 RX ORDER — SUMATRIPTAN 25 MG/1
25 TABLET, FILM COATED ORAL
Qty: 10 TABLET | Refills: 3 | Status: SHIPPED | OUTPATIENT
Start: 2022-05-19

## 2022-05-19 ASSESSMENT — PAIN SCALES - GENERAL: PAINLEVEL: MODERATE PAIN (4)

## 2022-05-19 NOTE — PROGRESS NOTES
Assessment and Plan:     Encounter for routine history and physical exam for male  Recommend consuming a healthy diet and exercising.  He declines HIV and hepatitis C screening.  He is up-to-date on vaccinations.    Lipid screening  - Lipid panel reflex to direct LDL Fasting  - Lipid panel reflex to direct LDL Fasting    Orgasmic headache  He continues sumatriptan as needed.  - SUMAtriptan (IMITREX) 25 MG tablet  Dispense: 10 tablet; Refill: 3    Seborrheic dermatitis  He continues ketoconazole.  Symptoms have improved.  - ketoconazole (NIZORAL) 2 % external shampoo  Dispense: 120 mL; Refill: 3    Chronic bilateral thoracic back pain  Differentials include myofascial pain, bulging or herniated disc.  Will refer to spine clinic for further evaluation per patient request.  Discussed symptomatic treatment including rest, ice, NSAIDs as needed.  - Spine Referral    Allergic rhinitis, unspecified seasonality, unspecified trigger  He continues cetirizine.    JOHN (obstructive sleep apnea)  He is using CPAP.    Class 2 severe obesity due to excess calories with serious comorbidity and body mass index (BMI) of 35.0 to 35.9 in adult (H)  Recommend consuming a healthy diet and exercising.    Medication management  - Basic metabolic panel  (Ca, Cl, CO2, Creat, Gluc, K, Na, BUN)  - Basic metabolic panel  (Ca, Cl, CO2, Creat, Gluc, K, Na, BUN)        Subjective:     Ishmael is a 36 year old male presenting to the clinic for a male physical.  Patient has been  for 3 years.  He has 2 children, a son who is autistic and is age 13 and a 11-year-old daughter with developmental delays.  Patient feels well supported.  He is not consuming a healthy diet as he is under stress at work.  He works at [employer].  He walks for exercise.  He has a history of orgasmic headaches and has started developing some migraine headaches during the day.  Headaches are primarily within the left temporal and parietal regions.  He experiences dizziness.   He does not experience nausea, vomiting, photophobia.  He takes sumatriptan as needed.  He has a history of JOHN and uses CPAP.  Patient uses ketoconazole for seborrheic dermatitis.  He is concerned as he has noticed some loss of height.  He has a history of hairline fractures and 2 vertebrae in childhood when he rolled out of a bunk bed.  Since then, sitting for prolonged periods causes thoracic back pain.  His father and paternal grandfather have significant abnormalities within their backs.  He would like to see the spine clinic. He has allergic rhinitis and takes Cetirizine.     Answers for HPI/ROS submitted by the patient on 5/18/2022  Frequency of exercise:: None  Getting at least 3 servings of Calcium per day:: Yes  Diet:: Regular (no restrictions)  Taking medications regularly:: Yes  Medication side effects:: None  Bi-annual eye exam:: NO  Dental care twice a year:: NO  Sleep apnea or symptoms of sleep apnea:: Sleep apnea  Additional concerns today:: Yes    Reviewof Systems: A complete 14 point review of systems was obtained and is negative or as stated in the history of present illness.    Social History     Socioeconomic History     Marital status:      Spouse name: Not on file     Number of children: Not on file     Years of education: Not on file     Highest education level: Not on file   Occupational History     Not on file   Tobacco Use     Smoking status: Never Smoker     Smokeless tobacco: Never Used   Substance and Sexual Activity     Alcohol use: Yes     Comment: one/month     Drug use: Never     Sexual activity: Yes     Partners: Female     Birth control/protection: Male Surgical     Comment:    Other Topics Concern     Parent/sibling w/ CABG, MI or angioplasty before 65F 55M? No   Social History Narrative     Not on file     Social Determinants of Health     Financial Resource Strain: Not on file   Food Insecurity: Not on file   Transportation Needs: Not on file   Physical Activity:  "Not on file   Stress: Not on file   Social Connections: Not on file   Intimate Partner Violence: Not on file   Housing Stability: Not on file       Active Ambulatory Problems     Diagnosis Date Noted     Allergic rhinitis, unspecified seasonality, unspecified trigger 04/20/2021     Resolved Ambulatory Problems     Diagnosis Date Noted     No Resolved Ambulatory Problems     Past Medical History:   Diagnosis Date     Arthritis      Depression      Hypertension      Sleep apnea        Family History   Problem Relation Age of Onset     Hypertension Mother      Diabetes Brother         type 1 diabetes     Diabetes Paternal Grandmother      Seizure Disorder Daughter        Objective:     /80 (BP Location: Left arm, Patient Position: Sitting, Cuff Size: Adult Large)   Pulse 69   Temp 97.4  F (36.3  C)   Resp 16   Ht 1.765 m (5' 9.5\")   Wt 109.9 kg (242 lb 3.2 oz)   SpO2 94%   BMI 35.25 kg/m      Patient is alert, in no obvious distress.   Skin: Warm, dry.  No lesions or rashes.  Skin turgor rapid return.   HEENT:  Head normocephalic, atraumatic.  Eyes normal.  PERRL.  EOM's intact.  No nystagmus. Ears normal.  Nose patent, mucosa pink.  Oropharynx mucosa pink.  No lesions or tonsillar enlargement.   Neck: Supple, no lymphadenopathy, JVD, bruits noted.  No thyromegaly.  Lungs:  Clear to auscultation. Respirations even and unlabored.  No wheezing or rales noted.   Heart:  Regular rate and rhythm.  No murmurs, S3, S4, gallops, or rubs.    Abdomen: Soft, nontender.  No organomegaly. Bowel sounds normoactive. No guarding or masses noted.   :  deferred  Musculoskeletal:  Full ROM of extremities.  DTRs symmetrical, sensations intact.  No obvious deformity.  Muscle strength equal +5/5.   Neurological:  Cranial nerves 2-12 intact.                      "

## 2022-05-24 NOTE — PROGRESS NOTES
ASSESSMENT: Ishmael Petersen is a 36 year old male with past medical history significant for obstructive sleep apnea, obesity who presents today for new patient evaluation of chronic midline thoracic spine pain with intermittent radiation around the chest wall to the abdomen which could represent thoracic radiculitis versus myofascial pain.  Pain is typically mild but can be severe during flares.  He does not have any lower extremity symptoms.  He is neurologically intact.  No red flags.    PLAN:  A shared decision making model was used.  The patient's values and choices were respected.  The following represents what was discussed and decided upon by the physician assistant and the patient.      1.  DIAGNOSTIC TESTS: I ordered an x-ray lumbar spine as an initial evaluation.  If symptoms fail to improve with conservative measures I would recommend obtaining an MRI thoracic spine.    2.  PHYSICAL THERAPY:  Discussed the importance of core strengthening, ROM, stretching exercises with the patient and how each of these entities is important in decreasing pain.  Explained to the patient that the purpose of physical therapy is to teach the patient a home exercise program.  These exercises need to be performed every day in order to decrease pain and prevent future occurrences of pain.  I entered a referral to physical therapy.    3.  MEDICATIONS:    -I prescribed cyclobenzaprine 10 mg 3 times daily as needed.  - Patient can continue Aleve as needed.    4.  INTERVENTIONS: No interventions were ordered.  Patient could potentially benefit from interventional pain management if he fails to improve with conservative treatment, depending on the results of his MRI.    5.  PATIENT EDUCATION: Patient is in agreement the above plan.  All questions were answered.    6.  FOLLOW-UP:   I will send the patient a [employer] message with his x-ray results.  I asked the patient to please send me a [employer] message if symptoms fail to  improve over the next 4 to 6 weeks.  At that time I would order an MRI thoracic spine and recommend a follow-up visit to review the results.  If he has questions or concerns, he should not hesitate to call.      SUBJECTIVE:  Ishmael Petersen  Is a 36 year old male who presents today for new patient evaluation of chronic thoracic spine pain.  Patient reports that  when he was in fifth or sixth grade he fell out of a bunk bed and landed on a dresser.  He fell to a concrete floor and had loss of consciousness.  He went to a chiropractor for back pain some days after the fall.  The chiropractor did x-rays and he was told that he had fractures at T8 and T9.  They did not recommend any bracing or other treatment other than adjustments.  Patient reports that ever since then he has dealt with intermittent mid back pain.  He states that typically the pain is a 1 or 2 out of 10 and some days he has 0 pain, but pain is easily flared up and can get up to a level 8 when severe.  He states that when he has a severe flare it takes him an entire day to recover.  He has some concerns because he has a family history of back problems including possibly ankylosing spondylitis.  He also feels like he is losing height.    Patient complains of midline mid back pain.  Pain is located between the shoulder blades.  Typically pain is equally severe on both sides of the spine but sometimes is worse on the right.  He has intermittent pain that wraps around the chest wall into the upper abdomen.  Radiating pain is equally severe on both sides.  He reports he has had heartburn ever since his fall from the bunk bed which he attributes to some nerve damage affecting his stomach.  He describes the pain as mostly dull but occasionally sharp.  He denies any pain radiating in the buttocks or down the legs.  Denies any numbness, tingling, weakness down the legs.  Pain is aggravated with lifting his daughter.  She is 11 but has serious medical  problems and is developmentally delayed.  She weighs 60 pounds and he has to carry her often.  Pain is also aggravated with increased activity such as house or yard work.  He was doing some work on a bathroom in his basement recently which flared up his pain.  Pain is alleviated with doing self adjustments to his back, using a thoracic extension pillow.  He denies loss of bowel or bladder control.  Denies recent fevers, chills, sweats.    Patient has utilized chiropractic treatment off and on throughout his life.  He has not had any chiropractic treatment since before COVID.  He has never had physical therapy.  He has not had any spine surgeries or spine injections.  He uses Aleve about once every other week.  He tries to limit his use of this medication because his father overused NSAIDs and developed a stomach ulcer.    Current Outpatient Medications   Medication     cetirizine 10 mg TbDL     ketoconazole (NIZORAL) 2 % external shampoo     SUMAtriptan (IMITREX) 25 MG tablet         Allergies   Allergen Reactions     Aspartame Headache     Causes migraines.       Past Medical History:   Diagnosis Date     Arthritis      Depression      Hypertension      Sleep apnea         Patient Active Problem List   Diagnosis     Allergic rhinitis, unspecified seasonality, unspecified trigger     JOHN (obstructive sleep apnea)       Past Surgical History:   Procedure Laterality Date     ARTHROSCOPY KNEE Left      VASECTOMY         Family History   Problem Relation Age of Onset     Hypertension Mother      Diabetes Brother         type 1 diabetes     Diabetes Paternal Grandmother      Seizure Disorder Daughter        Social history: Patient is .  He works for [employer] at the [employer].  He drinks alcohol about once per month.  Denies tobacco use.  Denies illicit drug use.      ROS: Positive for weight gain, headache, ringing in ears, eye pain, abdominal pain, reflux, joint pain, muscle pain, muscle fatigue, itching, imbalance,  dizziness, anxiety, depression.  Specifically negative for bowel/bladder dysfunction, fevers,chills, appetite changes, unexplained weight loss.   Otherwise 13 systems reviewed are negative.  Please see the patient's intake questionnaire from today for details.      OBJECTIVE:  PHYSICAL EXAMINATION:    CONSTITUTIONAL:  Vital signs as above.  No acute distress.  The patient is well nourished and well groomed.  PSYCHIATRIC:  The patient is awake, alert, oriented to person, place, time and answering questions appropriately with clear speech.    HEENT: Normocephalic, atraumatic.  Sclera clear.  Neck is supple.  SKIN:  Skin over the face, bilateral lower extremities, and posterior torso is clean, dry, intact without rashes.    GAIT:  Gait is non-antalgic.  The patient is able to heel and toe walk without significant difficulty.    STANDING EXAMINATION: No tenderness to palpation thoracic spinous processes.  No tenderness palpation thoracic paraspinous muscles.  Thoracolumbar range of motion is full.  MUSCLE STRENGTH:  The patient has 5/5 strength for the bilateral hip flexors, knee flexors/extensors, ankle dorsiflexors/plantar flexors, great toe extensors, ankle evertors/invertors.  NEUROLOGICAL: 2+ patellar, and 1+ achilles reflexes bilaterally.  Negative Babinski's bilaterally.  No ankle clonus bilaterally. Sensation to light touch is intact in the bilateral L4, L5, and S1 dermatomes.  VASCULAR:  2/4 dorsalis pedis pulses bilaterally.  Bilateral lower extremities are warm.  There is no pitting edema of the bilateral lower extremities.  ABDOMINAL:  Soft, non-distended, non-tender throughout all quadrants.  No pulsatile mass palpated in the left lower quadrant.  LYMPH NODES:  No palpable or tender inguinal lymph nodes.  MUSCULOSKELETAL: Straight leg raise is negative bilaterally.

## 2022-05-26 ENCOUNTER — HOSPITAL ENCOUNTER (OUTPATIENT)
Dept: RADIOLOGY | Facility: HOSPITAL | Age: 37
Discharge: HOME | End: 2022-05-26
Attending: PHYSICIAN ASSISTANT | Admitting: PHYSICIAN ASSISTANT
Payer: COMMERCIAL

## 2022-05-26 ENCOUNTER — OFFICE VISIT (OUTPATIENT)
Dept: PHYSICAL MEDICINE AND REHAB | Facility: CLINIC | Age: 37
End: 2022-05-26
Attending: NURSE PRACTITIONER
Payer: COMMERCIAL

## 2022-05-26 VITALS
SYSTOLIC BLOOD PRESSURE: 145 MMHG | BODY MASS INDEX: 34.83 KG/M2 | HEIGHT: 70 IN | HEART RATE: 75 BPM | WEIGHT: 243.3 LBS | TEMPERATURE: 97.7 F | DIASTOLIC BLOOD PRESSURE: 80 MMHG

## 2022-05-26 DIAGNOSIS — M54.6 CHRONIC BILATERAL THORACIC BACK PAIN: ICD-10-CM

## 2022-05-26 DIAGNOSIS — G89.29 CHRONIC BILATERAL THORACIC BACK PAIN: ICD-10-CM

## 2022-05-26 DIAGNOSIS — M79.18 MYOFASCIAL PAIN: Primary | ICD-10-CM

## 2022-05-26 PROCEDURE — 99204 OFFICE O/P NEW MOD 45 MIN: CPT | Performed by: PHYSICIAN ASSISTANT

## 2022-05-26 PROCEDURE — 72070 X-RAY EXAM THORAC SPINE 2VWS: CPT

## 2022-05-26 RX ORDER — CYCLOBENZAPRINE HCL 10 MG
10 TABLET ORAL 3 TIMES DAILY PRN
Qty: 30 TABLET | Refills: 0 | Status: SHIPPED | OUTPATIENT
Start: 2022-05-26 | End: 2023-01-18

## 2022-05-26 ASSESSMENT — PAIN SCALES - GENERAL: PAINLEVEL: MODERATE PAIN (4)

## 2022-05-26 NOTE — PATIENT INSTRUCTIONS
An order for physicaltherapy has been provided today.  Someone will call you to schedule physical therapy or you can call [phone] to schedule physical therapy.  It will be very important for you to do your physical therapy exercises on aregular basis to decrease your pain and prevent future flares of pain.     [facility] Scheduling    Please call [phone] to schedule your image(s) (select option#1). There are 2 different locations, see below. You can do walk-in visits for xray only images if you want.     [facility]  [address] [place] [id number]      [place] [id number] [facility] [id number]    Please send me a [employer] message or  call us at [phone] if things are not improving in the next four to six weeks.

## 2022-05-26 NOTE — LETTER
5/26/2022         RE: Ishmael Petersen  [address] [place] [facility] [address]        Dear Colleague,    Thank you for referring your patient, Ishmael Petersen, to the [facility]. Please see a copy of my visit note below.    ASSESSMENT: Ishmael Petersen is a 36 year old male with past medical history significant for obstructive sleep apnea, obesity who presents today for new patient evaluation of chronic midline thoracic spine pain with intermittent radiation around the chest wall to the abdomen which could represent thoracic radiculitis versus myofascial pain.  Pain is typically mild but can be severe during flares.  He does not have any lower extremity symptoms.  He is neurologically intact.  No red flags.    PLAN:  A shared decision making model was used.  The patient's values and choices were respected.  The following represents what was discussed and decided upon by the physician assistant and the patient.      1.  DIAGNOSTIC TESTS: I ordered an x-ray lumbar spine as an initial evaluation.  If symptoms fail to improve with conservative measures I would recommend obtaining an MRI thoracic spine.    2.  PHYSICAL THERAPY:  Discussed the importance of core strengthening, ROM, stretching exercises with the patient and how each of these entities is important in decreasing pain.  Explained to the patient that the purpose of physical therapy is to teach the patient a home exercise program.  These exercises need to be performed every day in order to decrease pain and prevent future occurrences of pain.  I entered a referral to physical therapy.    3.  MEDICATIONS:    -I prescribed cyclobenzaprine 10 mg 3 times daily as needed.  - Patient can continue Aleve as needed.    4.  INTERVENTIONS: No interventions were ordered.  Patient could potentially benefit from interventional pain management if he fails to improve with conservative treatment, depending on the results  of his MRI.    5.  PATIENT EDUCATION: Patient is in agreement the above plan.  All questions were answered.    6.  FOLLOW-UP:   I will send the patient a [employer] message with his x-ray results.  I asked the patient to please send me a [employer] message if symptoms fail to improve over the next 4 to 6 weeks.  At that time I would order an MRI thoracic spine and recommend a follow-up visit to review the results.  If he has questions or concerns, he should not hesitate to call.      SUBJECTIVE:  Ishmael Petersen  Is a 36 year old male who presents today for new patient evaluation of chronic thoracic spine pain.  Patient reports that  when he was in fifth or sixth grade he fell out of a bunk bed and landed on a dresser.  He fell to a concrete floor and had loss of consciousness.  He went to a chiropractor for back pain some days after the fall.  The chiropractor did x-rays and he was told that he had fractures at T8 and T9.  They did not recommend any bracing or other treatment other than adjustments.  Patient reports that ever since then he has dealt with intermittent mid back pain.  He states that typically the pain is a 1 or 2 out of 10 and some days he has 0 pain, but pain is easily flared up and can get up to a level 8 when severe.  He states that when he has a severe flare it takes him an entire day to recover.  He has some concerns because he has a family history of back problems including possibly ankylosing spondylitis.  He also feels like he is losing height.    Patient complains of midline mid back pain.  Pain is located between the shoulder blades.  Typically pain is equally severe on both sides of the spine but sometimes is worse on the right.  He has intermittent pain that wraps around the chest wall into the upper abdomen.  Radiating pain is equally severe on both sides.  He reports he has had heartburn ever since his fall from the bunk bed which he attributes to some nerve damage affecting his  stomach.  He describes the pain as mostly dull but occasionally sharp.  He denies any pain radiating in the buttocks or down the legs.  Denies any numbness, tingling, weakness down the legs.  Pain is aggravated with lifting his daughter.  She is 11 but has serious medical problems and is developmentally delayed.  She weighs 60 pounds and he has to carry her often.  Pain is also aggravated with increased activity such as house or yard work.  He was doing some work on a bathroom in his basement recently which flared up his pain.  Pain is alleviated with doing self adjustments to his back, using a thoracic extension pillow.  He denies loss of bowel or bladder control.  Denies recent fevers, chills, sweats.    Patient has utilized chiropractic treatment off and on throughout his life.  He has not had any chiropractic treatment since before COVID.  He has never had physical therapy.  He has not had any spine surgeries or spine injections.  He uses Aleve about once every other week.  He tries to limit his use of this medication because his father overused NSAIDs and developed a stomach ulcer.    Current Outpatient Medications   Medication     cetirizine 10 mg TbDL     ketoconazole (NIZORAL) 2 % external shampoo     SUMAtriptan (IMITREX) 25 MG tablet         Allergies   Allergen Reactions     Aspartame Headache     Causes migraines.       Past Medical History:   Diagnosis Date     Arthritis      Depression      Hypertension      Sleep apnea         Patient Active Problem List   Diagnosis     Allergic rhinitis, unspecified seasonality, unspecified trigger     JOHN (obstructive sleep apnea)       Past Surgical History:   Procedure Laterality Date     ARTHROSCOPY KNEE Left      VASECTOMY         Family History   Problem Relation Age of Onset     Hypertension Mother      Diabetes Brother         type 1 diabetes     Diabetes Paternal Grandmother      Seizure Disorder Daughter        Social history: Patient is .  He works  for [employer] [place]  He drinks alcohol about once per month.  Denies tobacco use.  Denies illicit drug use.      ROS: Positive for weight gain, headache, ringing in ears, eye pain, abdominal pain, reflux, joint pain, muscle pain, muscle fatigue, itching, imbalance, dizziness, anxiety, depression.  Specifically negative for bowel/bladder dysfunction, fevers,chills, appetite changes, unexplained weight loss.   Otherwise 13 systems reviewed are negative.  Please see the patient's intake questionnaire from today for details.      OBJECTIVE:  PHYSICAL EXAMINATION:    CONSTITUTIONAL:  Vital signs as above.  No acute distress.  The patient is well nourished and well groomed.  PSYCHIATRIC:  The patient is awake, alert, oriented to person, place, time and answering questions appropriately with clear speech.    HEENT: Normocephalic, atraumatic.  Sclera clear.  Neck is supple.  SKIN:  Skin over the face, bilateral lower extremities, and posterior torso is clean, dry, intact without rashes.    GAIT:  Gait is non-antalgic.  The patient is able to heel and toe walk without significant difficulty.    STANDING EXAMINATION: No tenderness to palpation thoracic spinous processes.  No tenderness palpation thoracic paraspinous muscles.  Thoracolumbar range of motion is full.  MUSCLE STRENGTH:  The patient has 5/5 strength for the bilateral hip flexors, knee flexors/extensors, ankle dorsiflexors/plantar flexors, great toe extensors, ankle evertors/invertors.  NEUROLOGICAL: 2+ patellar, and 1+ achilles reflexes bilaterally.  Negative Babinski's bilaterally.  No ankle clonus bilaterally. Sensation to light touch is intact in the bilateral L4, L5, and S1 dermatomes.  VASCULAR:  2/4 dorsalis pedis pulses bilaterally.  Bilateral lower extremities are warm.  There is no pitting edema of the bilateral lower extremities.  ABDOMINAL:  Soft, non-distended, non-tender throughout all quadrants.  No pulsatile mass palpated in the left lower  quadrant.  LYMPH NODES:  No palpable or tender inguinal lymph nodes.  MUSCULOSKELETAL: Straight leg raise is negative bilaterally.          Again, thank you for allowing me to participate in the care of your patient.        Sincerely,        Floridalma Franco PA-C

## 2022-06-02 ENCOUNTER — HOSPITAL ENCOUNTER (OUTPATIENT)
Dept: PHYSICAL THERAPY | Facility: REHABILITATION | Age: 37
Discharge: HOME | End: 2022-06-02
Attending: PHYSICIAN ASSISTANT
Payer: COMMERCIAL

## 2022-06-02 DIAGNOSIS — M54.6 CHRONIC BILATERAL THORACIC BACK PAIN: ICD-10-CM

## 2022-06-02 DIAGNOSIS — M79.18 MYOFASCIAL PAIN: ICD-10-CM

## 2022-06-02 DIAGNOSIS — G89.29 CHRONIC BILATERAL THORACIC BACK PAIN: ICD-10-CM

## 2022-06-02 PROCEDURE — 97161 PT EVAL LOW COMPLEX 20 MIN: CPT | Mod: GP | Performed by: PHYSICAL THERAPIST

## 2022-06-02 PROCEDURE — 97110 THERAPEUTIC EXERCISES: CPT | Mod: GP | Performed by: PHYSICAL THERAPIST

## 2022-06-02 NOTE — PROGRESS NOTES
Date 6/2/22   Exercise    midback and midback rotation, thread the needle Hold 30 seconds X 1-3 reps   catback Hold 5 seconds X 3-5 reps

## 2022-06-08 ENCOUNTER — HOSPITAL ENCOUNTER (OUTPATIENT)
Dept: PHYSICAL THERAPY | Facility: REHABILITATION | Age: 37
Discharge: HOME | End: 2022-06-08
Payer: COMMERCIAL

## 2022-06-08 DIAGNOSIS — M79.18 MYOFASCIAL PAIN: ICD-10-CM

## 2022-06-08 DIAGNOSIS — M54.6 CHRONIC BILATERAL THORACIC BACK PAIN: Primary | ICD-10-CM

## 2022-06-08 DIAGNOSIS — G89.29 CHRONIC BILATERAL THORACIC BACK PAIN: Primary | ICD-10-CM

## 2022-06-08 PROCEDURE — 97110 THERAPEUTIC EXERCISES: CPT | Mod: GP | Performed by: PHYSICAL THERAPIST

## 2022-06-08 PROCEDURE — 97112 NEUROMUSCULAR REEDUCATION: CPT | Mod: GP | Performed by: PHYSICAL THERAPIST

## 2022-06-08 NOTE — PROGRESS NOTES
Date 6/8/22 6/2/22   Exercise     midback and midback rotation, thread the needle Did not feel he needed to review Hold 30 seconds X 1-3 reps   catback Did not want to review Hold 5 seconds X 3-5 reps   Chin tuck and scapular retraction Hold 10 seconds x 10 reps    Stretches: doorway pec, scalene, UT, LS, QL Hold 30 seconds x 1-3 reps    Rhomboid stretch Discussed.  He already does    UBE 4'    Green tband: shoulder ext X 20    Green tband: scapular row X 20    Shoulder PNF D2 pattern, green tband, blue tband X 20                     Assessment:  Pt returns for his first follow-up and has been compliant with his HEP.  Added other midback and cervical stretches as well as midback strengthening with tband.  He did not experience any pain with the addition of the strengthening ex.  Applied and edu pt on benefits and precautions of KT for his midback.  Will see if it offers some stabilization and help with retraining as well as pain control.      Deepa Hester, PT  6/8/22

## 2022-07-06 ENCOUNTER — TELEPHONE (OUTPATIENT)
Dept: PHYSICAL THERAPY | Facility: REHABILITATION | Age: 37
End: 2022-07-06

## 2022-08-03 NOTE — ADDENDUM NOTE
Encounter addended by: Deepa Hester, PT on: 8/3/2022 12:11 PM   Actions taken: Episode resolved, Clinical Note Signed

## 2022-08-03 NOTE — PROGRESS NOTES
[facility]    Outpatient Physical Therapy Discharge Note  Patient: Ishmael Petersen  : 1985    Beginning/End Dates of Reporting Period:  22 to 22    Referring Provider: Floridalma Franco PA-C    Therapy Diagnosis: chronic thoracic pain     Client Self Report: Has been a busy week.  Felt the ex are ok, no increase in symptoms with them.    Objective Measurements:  Objective Measure: Oswestery: 24 on IE 22     Objective Measure: pain 2/10        Goals:  Goal Identifier sit   Goal Description Pt will be able to increase sitting time to 2 hours or thorugh a movie with managable reported pain levels of 0-2/10   Target Date 22   Date Met      Progress (detail required for progress note):       Goal Identifier lifitng   Goal Description Pt will be able to lift daughter, weighing 60#, with more ease and decrease pain levels of -2/10 as oswestry decreases by 10 points and strength improves   Target Date 22   Date Met      Progress (detail required for progress note):       Goal Identifier     Goal Description     Target Date     Date Met      Progress (detail required for progress note):       Goal Identifier     Goal Description     Target Date     Date Met      Progress (detail required for progress note):       Goal Identifier     Goal Description     Target Date     Date Met      Progress (detail required for progress note):       Goal Identifier     Goal Description     Target Date     Date Met      Progress (detail required for progress note):       Goal Identifier     Goal Description     Target Date     Date Met      Progress (detail required for progress note):       Goal Identifier     Goal Description     Target Date     Date Met      Progress (detail required for progress note):             Plan:  Discharge from therapy.    Discharge:    Reason for Discharge: Pt has not been  seen in clinic in over 2 months so is discharged from caseload    Equipment Issued:     Discharge Plan: Patient to continue home program.

## 2022-10-01 ENCOUNTER — HEALTH MAINTENANCE LETTER (OUTPATIENT)
Age: 37
End: 2022-10-01

## 2022-10-03 ENCOUNTER — OFFICE VISIT (OUTPATIENT)
Dept: FAMILY MEDICINE | Facility: CLINIC | Age: 37
End: 2022-10-03
Payer: COMMERCIAL

## 2022-10-03 VITALS
DIASTOLIC BLOOD PRESSURE: 72 MMHG | OXYGEN SATURATION: 97 % | HEIGHT: 70 IN | SYSTOLIC BLOOD PRESSURE: 136 MMHG | RESPIRATION RATE: 18 BRPM | BODY MASS INDEX: 35.45 KG/M2 | TEMPERATURE: 97 F | WEIGHT: 247.6 LBS | HEART RATE: 84 BPM

## 2022-10-03 DIAGNOSIS — L60.0 INGROWN TOENAIL OF RIGHT FOOT: Primary | ICD-10-CM

## 2022-10-03 PROCEDURE — 90686 IIV4 VACC NO PRSV 0.5 ML IM: CPT | Performed by: FAMILY MEDICINE

## 2022-10-03 PROCEDURE — 0124A COVID-19,PF,PFIZER BOOSTER BIVALENT: CPT | Performed by: FAMILY MEDICINE

## 2022-10-03 PROCEDURE — 91312 COVID-19,PF,PFIZER BOOSTER BIVALENT: CPT | Performed by: FAMILY MEDICINE

## 2022-10-03 PROCEDURE — 99213 OFFICE O/P EST LOW 20 MIN: CPT | Mod: 25 | Performed by: FAMILY MEDICINE

## 2022-10-03 PROCEDURE — 90471 IMMUNIZATION ADMIN: CPT | Performed by: FAMILY MEDICINE

## 2022-10-03 RX ORDER — CEPHALEXIN 500 MG/1
500 CAPSULE ORAL 2 TIMES DAILY
Qty: 14 CAPSULE | Refills: 0 | Status: SHIPPED | OUTPATIENT
Start: 2022-10-03 | End: 2022-10-10

## 2022-10-03 ASSESSMENT — PAIN SCALES - GENERAL: PAINLEVEL: MILD PAIN (2)

## 2022-10-03 NOTE — PATIENT INSTRUCTIONS
Soak your foot for 10 minutes twice daily, ideally 3 times daily.  Apply a small amount of antibacterial ointment to the area and keep it bandaged.  Begin cephalexin as prescribed.  Schedule a visit with podiatry for further evaluation and treatment.

## 2022-10-03 NOTE — PROGRESS NOTES
"  Assessment & Plan     Ingrown toenail of right foot  Advised warm soaks ideally 3 times daily, but he is to do his best given on work hours.  And topical antibiotic ointment with bandage placement.  Initiate cephalexin.  Follow-up with podiatry for definitive treatment.  - cephALEXin (KEFLEX) 500 MG capsule; Take 1 capsule (500 mg) by mouth 2 times daily for 7 days  - Orthopedic  Referral; Future    COVID and seasonal influenza vaccines administered today.             BMI:   Estimated body mass index is 36.04 kg/m  as calculated from the following:    Height as of this encounter: 1.765 m (5' 9.5\").    Weight as of this encounter: 112.3 kg (247 lb 9.6 oz).           No follow-ups on file.    Charlotte Edgar MD  [facility] is a 37 year old, presenting for the following health issues:  Infected right big toe (Had ingrown nail )      Mild toe pain first toe, lateral edge for the past month or so.  He was able to trim a very sharp edge off of his toenail, had a large volume of pus that drained earlier in the month.  Notes over the past week he has had significant worsening once again with increasing redness and purulent drainage.  He has been using an antibiotic ointment, covering it with a Band-Aid, has soaked it once.  Interested in further treatments.  Denies fevers or chills.  He is interested in flu vaccine and COVID booster today.    History of Present Illness       Reason for visit:  Toe infection, possible solutions for ingrown toenail  Symptom onset:  1-2 weeks ago  Symptoms include:  Toe infection/ingrown toenail  Symptom intensity:  Moderate  Symptom progression:  Staying the same  Had these symptoms before:  No  What makes it worse:  Leaving it alone  What makes it better:  Ointment/bandaid    He eats 0-1 servings of fruits and vegetables daily.He consumes 2 sweetened beverage(s) daily.He exercises with enough effort to increase his heart rate 9 or " "less minutes per day.  He exercises with enough effort to increase his heart rate 3 or less days per week.   He is taking medications regularly.             Review of Systems         Objective    /72   Pulse 84   Temp 97  F (36.1  C) (Oral)   Resp 18   Ht 1.765 m (5' 9.5\")   Wt 112.3 kg (247 lb 9.6 oz)   SpO2 97%   BMI 36.04 kg/m    Body mass index is 36.04 kg/m .  Physical Exam   Right great toe is with swelling, mild erythema, and a small amount of purulence on the lateral edge of his right great toenail distally.  No granulation tissue or significant deformity noted.                    "

## 2022-10-13 ENCOUNTER — OFFICE VISIT (OUTPATIENT)
Dept: PODIATRY | Facility: CLINIC | Age: 37
End: 2022-10-13
Attending: FAMILY MEDICINE
Payer: COMMERCIAL

## 2022-10-13 VITALS — WEIGHT: 247 LBS | OXYGEN SATURATION: 97 % | HEIGHT: 70 IN | BODY MASS INDEX: 35.36 KG/M2 | HEART RATE: 80 BPM

## 2022-10-13 DIAGNOSIS — L60.0 INGROWN TOENAIL: Primary | ICD-10-CM

## 2022-10-13 PROCEDURE — 11750 EXCISION NAIL&NAIL MATRIX: CPT | Mod: T5 | Performed by: PODIATRIST

## 2022-10-13 PROCEDURE — 99243 OFF/OP CNSLTJ NEW/EST LOW 30: CPT | Mod: 25 | Performed by: PODIATRIST

## 2022-10-13 RX ORDER — LIDOCAINE HYDROCHLORIDE 20 MG/ML
5 INJECTION, SOLUTION INFILTRATION; PERINEURAL ONCE
Status: COMPLETED | OUTPATIENT
Start: 2022-10-13 | End: 2022-10-13

## 2022-10-13 RX ADMIN — LIDOCAINE HYDROCHLORIDE 5 ML: 20 INJECTION, SOLUTION INFILTRATION; PERINEURAL at 14:37

## 2022-10-13 ASSESSMENT — PAIN SCALES - GENERAL: PAINLEVEL: MILD PAIN (3)

## 2022-10-13 NOTE — PATIENT INSTRUCTIONS
POSTOPERATIVE INSTRUCTIONS AFTER CHEMICAL NAIL REMOVAL SURGERY    What to expect after surgery:  Your toe will be numb for around 2-8 hours after your nail procedure due to the shots that were given.  Expect some degree of soreness in your toe later today when the numbness wears off.  Rest, elevation and ice applied at the ankle will help ease the pain. Your bandage was wrapped snug to cut down on bleeding.    This may feel tight when the numbness wears off.  Please remove the bandage tomorrow morning and begin the foot soaks described below.  Warm water will feel good and helps to ease the pain  How to Care for Your Toe After Surgery  One daily foot soak will be necessary to heal properly.  Chemicals were used to kill the root of the nail.  Expect local redness, drainage and tenderness , this will last for 6-8 weeks.  Soaking helps loosen the scab and dried drainage.  Failure to soak leads to a hard scab that blocks drainage.  Back up drainage increases the pain and the scab may need to be removed with another office procedure.  You will heal much quicker and be more comfortable if you are consistent with local wound care and foot soaks.  Soak one time every day for 2 weeks.  Soaks should last 10 minutes.  Soak after taking a shower to get the germs out.  Soak in warm water with hydrogen peroxide 5 parts water to 1 part hydrogen peroxide.  A small amount of bleeding may be noted which is normal.   Clean the surgical site with a Q-tip during each soak.  Dip the Q-tip in the water and gently clean away any crusted drainage.  The area may be tender but you will not harm anything with the Q-tip.  Pat the area dry and allow a few minutes to air dry before applying any bandages.  Flexible fabric style band aids work best.  In general, the area will be wet from drainage.  A small dab of antibiotic ointment is okay but watch out for excessive moisture.  White and wrinkled skin is a sign of too much moisture and that the  skin needs to be dried out. It is ok to allow the toe some air by removing the band aid as needed.  Activity  Feel free to do normal activities as tolerated on the following day.  Wear open toe sandals if regular shoes are not comfortable.  Avoid shoes that are tight on the toe.  Medications   Finish any antibiotics if they have been prescribed.  Tylenol or ibuprofen may be used as needed for pain.  Icing and elevation also help with pain and swelling.  Risks  Watch for signs of infection.  It is normal to see redness, local tenderness, and drainage around the nail area for up to 8 weeks after permanent nail removal surgery.  Call the clinic at [phone] if you see red streaks spreading up the toe, foot, or leg.  Fever and chills are also concerning and you should notify the clinic if you are having these symptoms.  If these symptoms occur when the clinic is closed, please go to urgent care.  How Well Does Permanent Nail Surgery Work?  Permanent nail surgery means that we intend that the nail problem will not come back.  In a small percentage of patients, nail problems return in about 6 months to a year.  We would like to see you back in the office if you are experiencing problems in the future.  Please call [phone] with any additional questions.

## 2022-10-13 NOTE — PROGRESS NOTES
FOOT AND ANKLE SURGERY/PODIATRY CONSULT NOTE         ASSESSMENT:   Ingrown toenail right  great toe        TREATMENT:  Performed partial nail excision and matrixectomy of the lateral border of the right great toenail today under local anesthesia of 2% lidocaine plain via the phenol and alcohol technique.  The patient tolerated the procedure and anesthesia well and was discharged in good condition.  He was given both written and verbal postoperative instructions. He is to return to the clinic as needed.           HPI: I was asked to see Farhatalexsnader Petersen today to evaluate and treat a painful ingrown toenail involving the right great toe.  The patient indicated that he has had a problem with his toenail for several weeks.  He has had an ingrown toenail for several years.  Typically he is able to remove a portion of the nail to give him some temporary relief.  This episode however has not improved.  He developed some redness, pain and drainage on the outer portion of the right great toenail.  The pain is moderate to severe and aggravated by his  shoe gear.  He denies any particular trauma to the toe.  He is currently taking oral antibiotics for his infection.  He has noticed a slight improvement.  The patient was seen in consultation at the request of  Charlotte Gunter MD  for evaluation and treatment of infected ingrown toenail right  great toe.         Past Medical History:   Diagnosis Date     Arthritis      Depression      Hypertension      Sleep apnea        Social History     Socioeconomic History     Marital status:      Spouse name: Not on file     Number of children: Not on file     Years of education: Not on file     Highest education level: Not on file   Occupational History     Not on file   Tobacco Use     Smoking status: Never     Smokeless tobacco: Never   Substance and Sexual Activity     Alcohol use: Yes     Comment: one/month     Drug use: Never     Sexual activity: Yes     Partners: Female      Birth control/protection: Male Surgical     Comment:    Other Topics Concern     Parent/sibling w/ CABG, MI or angioplasty before 65F 55M? No   Social History Narrative     Not on file     Social Determinants of Health     Financial Resource Strain: Not on file   Food Insecurity: Not on file   Transportation Needs: Not on file   Physical Activity: Not on file   Stress: Not on file   Social Connections: Not on file   Intimate Partner Violence: Not on file   Housing Stability: Not on file          Allergies   Allergen Reactions     Aspartame Headache     Causes migraines.          Current Outpatient Medications:      cetirizine 10 mg TbDL, [CETIRIZINE 10 MG TBDL] Take by mouth., Disp: , Rfl:      cyclobenzaprine (FLEXERIL) 10 MG tablet, Take 1 tablet (10 mg) by mouth 3 times daily as needed for muscle spasms, Disp: 30 tablet, Rfl: 0     ketoconazole (NIZORAL) 2 % external shampoo, [KETOCONAZOLE (NIZORAL) 2 % SHAMPOO] APPLY TO DAMP SKIN, LATHER, LEAVE ON 5 MINUTES, AND RINSE, Disp: 120 mL, Rfl: 3     SUMAtriptan (IMITREX) 25 MG tablet, Take 1 tablet (25 mg) by mouth every 2 hours as needed for migraine, Disp: 10 tablet, Rfl: 3     Family History   Problem Relation Age of Onset     Hypertension Mother      Diabetes Brother         type 1 diabetes     Diabetes Paternal Grandmother      Seizure Disorder Daughter         Social History     Socioeconomic History     Marital status:      Spouse name: Not on file     Number of children: Not on file     Years of education: Not on file     Highest education level: Not on file   Occupational History     Not on file   Tobacco Use     Smoking status: Never     Smokeless tobacco: Never   Substance and Sexual Activity     Alcohol use: Yes     Comment: one/month     Drug use: Never     Sexual activity: Yes     Partners: Female     Birth control/protection: Male Surgical     Comment:    Other Topics Concern     Parent/sibling w/ CABG, MI or angioplasty before  "65F 55M? No   Social History Narrative     Not on file     Social Determinants of Health     Financial Resource Strain: Not on file   Food Insecurity: Not on file   Transportation Needs: Not on file   Physical Activity: Not on file   Stress: Not on file   Social Connections: Not on file   Intimate Partner Violence: Not on file   Housing Stability: Not on file        Review of Systems - Patient denies fever, chills, rash, wound, stiffness, limping, numbness, weakness, heart burn, blood in stool, chest pain with activity, calf pain when walking, shortness of breath with activity, chronic cough, easy bleeding/bruising, swelling of ankles, excessive thirst, fatigue, depression, anxiety.  Patient admits to painful ingrown toenail right great toe.      OBJECTIVE:  Appearance: alert, well appearing, and in no distress.    Pulse 80   Ht 1.765 m (5' 9.5\")   Wt 112 kg (247 lb)   SpO2 97%   BMI 35.95 kg/m       Body mass index is 35.95 kg/m .     General appearance: Patient is alert and fully cooperative with history & exam.  No sign of distress is noted during the visit.  Psychiatric: Affect is pleasant & appropriate.  Patient appears motivated to improve health.  Respiratory: Breathing is regular & unlabored while sitting.  HEENT: Hearing is intact to spoken word.  Speech is clear.  No gross evidence of visual impairment that would impact ambulation.    Vascular: Dorsalis pedis and posterior tibial pulses are palpable. There is pedal hair growth bilaterally.  CFT < 3 sec from anterior tibial surface to distal digits bilaterally. There is no appreciable edema noted.  Dermatologic: The lateral border of the right great toenail is incurvated.  There is mild edema and erythema along the lateral margin of the right  great toenail.  Turgor and texture are within normal limits. No coloration or temperature changes. No primary or secondary lesions noted.  Neurologic: All epicritic and proprioceptive sensations are grossly intact " bilaterally.  Musculoskeletal: All active and passive ankle, subtalar, midtarsal, and 1st MPJ range of motion are grossly intact without pain or crepitus, with the exception of none. Manual muscle strength is within normal limits bilaterally. All dorsiflexors, plantarflexors, invertors, evertors are intact bilaterally.  Tenderness present to the lateral margin of the right great toenail on palpation.  No tenderness to the right great toe with range of motion. Calf is soft/non-tender without warmth/induration  Imaging:       No images are attached to the encounter or orders placed in the encounter.     No results found.   No results found.       Wade Gale DPM  [facility]/Podiatry

## 2022-10-13 NOTE — LETTER
10/13/2022         RE: Ishmael Petersen  [address] [place] [id number]        Dear Colleague,    Thank you for referring your patient, Ishmael Petersen, to the [facility]. Please see a copy of my visit note below.    FOOT AND ANKLE SURGERY/PODIATRY CONSULT NOTE         ASSESSMENT:   Ingrown toenail right  great toe        TREATMENT:  Performed partial nail excision and matrixectomy of the lateral border of the right great toenail today under local anesthesia of 2% lidocaine plain via the phenol and alcohol technique.  The patient tolerated the procedure and anesthesia well and was discharged in good condition.  He was given both written and verbal postoperative instructions. He is to return to the clinic as needed.           HPI: I was asked to see Ishmael Petersen today to evaluate and treat a painful ingrown toenail involving the right great toe.  The patient indicated that he has had a problem with his toenail for several weeks.  He has had an ingrown toenail for several years.  Typically he is able to remove a portion of the nail to give him some temporary relief.  This episode however has not improved.  He developed some redness, pain and drainage on the outer portion of the right great toenail.  The pain is moderate to severe and aggravated by his  shoe gear.  He denies any particular trauma to the toe.  He is currently taking oral antibiotics for his infection.  He has noticed a slight improvement.  The patient was seen in consultation at the request of  Charlotte Gunter MD  for evaluation and treatment of infected ingrown toenail right  great toe.         Past Medical History:   Diagnosis Date     Arthritis      Depression      Hypertension      Sleep apnea        Social History     Socioeconomic History     Marital status:      Spouse name: Not on file     Number of children: Not on file     Years of education: Not on file     Highest education  level: Not on file   Occupational History     Not on file   Tobacco Use     Smoking status: Never     Smokeless tobacco: Never   Substance and Sexual Activity     Alcohol use: Yes     Comment: one/month     Drug use: Never     Sexual activity: Yes     Partners: Female     Birth control/protection: Male Surgical     Comment:    Other Topics Concern     Parent/sibling w/ CABG, MI or angioplasty before 65F 55M? No   Social History Narrative     Not on file     Social Determinants of Health     Financial Resource Strain: Not on file   Food Insecurity: Not on file   Transportation Needs: Not on file   Physical Activity: Not on file   Stress: Not on file   Social Connections: Not on file   Intimate Partner Violence: Not on file   Housing Stability: Not on file          Allergies   Allergen Reactions     Aspartame Headache     Causes migraines.          Current Outpatient Medications:      cetirizine 10 mg TbDL, [CETIRIZINE 10 MG TBDL] Take by mouth., Disp: , Rfl:      cyclobenzaprine (FLEXERIL) 10 MG tablet, Take 1 tablet (10 mg) by mouth 3 times daily as needed for muscle spasms, Disp: 30 tablet, Rfl: 0     ketoconazole (NIZORAL) 2 % external shampoo, [KETOCONAZOLE (NIZORAL) 2 % SHAMPOO] APPLY TO DAMP SKIN, LATHER, LEAVE ON 5 MINUTES, AND RINSE, Disp: 120 mL, Rfl: 3     SUMAtriptan (IMITREX) 25 MG tablet, Take 1 tablet (25 mg) by mouth every 2 hours as needed for migraine, Disp: 10 tablet, Rfl: 3     Family History   Problem Relation Age of Onset     Hypertension Mother      Diabetes Brother         type 1 diabetes     Diabetes Paternal Grandmother      Seizure Disorder Daughter         Social History     Socioeconomic History     Marital status:      Spouse name: Not on file     Number of children: Not on file     Years of education: Not on file     Highest education level: Not on file   Occupational History     Not on file   Tobacco Use     Smoking status: Never     Smokeless tobacco: Never   Substance  "and Sexual Activity     Alcohol use: Yes     Comment: one/month     Drug use: Never     Sexual activity: Yes     Partners: Female     Birth control/protection: Male Surgical     Comment:    Other Topics Concern     Parent/sibling w/ CABG, MI or angioplasty before 65F 55M? No   Social History Narrative     Not on file     Social Determinants of Health     Financial Resource Strain: Not on file   Food Insecurity: Not on file   Transportation Needs: Not on file   Physical Activity: Not on file   Stress: Not on file   Social Connections: Not on file   Intimate Partner Violence: Not on file   Housing Stability: Not on file        Review of Systems - Patient denies fever, chills, rash, wound, stiffness, limping, numbness, weakness, heart burn, blood in stool, chest pain with activity, calf pain when walking, shortness of breath with activity, chronic cough, easy bleeding/bruising, swelling of ankles, excessive thirst, fatigue, depression, anxiety.  Patient admits to painful ingrown toenail right great toe.      OBJECTIVE:  Appearance: alert, well appearing, and in no distress.    Pulse 80   Ht 1.765 m (5' 9.5\")   Wt 112 kg (247 lb)   SpO2 97%   BMI 35.95 kg/m       Body mass index is 35.95 kg/m .     General appearance: Patient is alert and fully cooperative with history & exam.  No sign of distress is noted during the visit.  Psychiatric: Affect is pleasant & appropriate.  Patient appears motivated to improve health.  Respiratory: Breathing is regular & unlabored while sitting.  HEENT: Hearing is intact to spoken word.  Speech is clear.  No gross evidence of visual impairment that would impact ambulation.    Vascular: Dorsalis pedis and posterior tibial pulses are palpable. There is pedal hair growth bilaterally.  CFT < 3 sec from anterior tibial surface to distal digits bilaterally. There is no appreciable edema noted.  Dermatologic: The lateral border of the right great toenail is incurvated.  There is mild " edema and erythema along the lateral margin of the right  great toenail.  Turgor and texture are within normal limits. No coloration or temperature changes. No primary or secondary lesions noted.  Neurologic: All epicritic and proprioceptive sensations are grossly intact bilaterally.  Musculoskeletal: All active and passive ankle, subtalar, midtarsal, and 1st MPJ range of motion are grossly intact without pain or crepitus, with the exception of none. Manual muscle strength is within normal limits bilaterally. All dorsiflexors, plantarflexors, invertors, evertors are intact bilaterally.  Tenderness present to the lateral margin of the right great toenail on palpation.  No tenderness to the right great toe with range of motion. Calf is soft/non-tender without warmth/induration  Imaging:       No images are attached to the encounter or orders placed in the encounter.     No results found.   No results found.       Wade Gale DPM  [facility]/Podiatry         Again, thank you for allowing me to participate in the care of your patient.        Sincerely,        Wade Yates DPM

## 2023-01-18 ENCOUNTER — MYC REFILL (OUTPATIENT)
Dept: PHYSICAL MEDICINE AND REHAB | Facility: CLINIC | Age: 38
End: 2023-01-18
Payer: COMMERCIAL

## 2023-01-18 DIAGNOSIS — M79.18 MYOFASCIAL PAIN: ICD-10-CM

## 2023-01-20 RX ORDER — CYCLOBENZAPRINE HCL 10 MG
10 TABLET ORAL 3 TIMES DAILY PRN
Qty: 30 TABLET | Refills: 0 | Status: SHIPPED | OUTPATIENT
Start: 2023-01-20 | End: 2023-12-12

## 2023-01-20 NOTE — TELEPHONE ENCOUNTER
Last appointment: 05/26/2022  Next appointment: None    Notes/Comments:      Rx request(s) has been reviewed.

## 2023-08-06 ENCOUNTER — HEALTH MAINTENANCE LETTER (OUTPATIENT)
Age: 38
End: 2023-08-06

## 2023-08-20 ASSESSMENT — ENCOUNTER SYMPTOMS
NERVOUS/ANXIOUS: 1
DIARRHEA: 0
CONSTIPATION: 0
FEVER: 0
HEMATURIA: 0
PARESTHESIAS: 0
CHILLS: 0
HEARTBURN: 1
HEADACHES: 0
PALPITATIONS: 0
SHORTNESS OF BREATH: 1
EYE PAIN: 0
COUGH: 0
WEAKNESS: 0
FREQUENCY: 0
ARTHRALGIAS: 1
NAUSEA: 0
DYSURIA: 0
MYALGIAS: 0
JOINT SWELLING: 1
HEMATOCHEZIA: 0
SORE THROAT: 0
ABDOMINAL PAIN: 0
DIZZINESS: 0

## 2023-08-22 PROBLEM — E78.2 MIXED HYPERLIPIDEMIA: Status: ACTIVE | Noted: 2023-08-22

## 2023-08-23 ENCOUNTER — OFFICE VISIT (OUTPATIENT)
Dept: FAMILY MEDICINE | Facility: CLINIC | Age: 38
End: 2023-08-23
Payer: COMMERCIAL

## 2023-08-23 VITALS
HEIGHT: 70 IN | RESPIRATION RATE: 18 BRPM | BODY MASS INDEX: 38.01 KG/M2 | OXYGEN SATURATION: 97 % | SYSTOLIC BLOOD PRESSURE: 134 MMHG | WEIGHT: 265.5 LBS | TEMPERATURE: 98.1 F | HEART RATE: 79 BPM | DIASTOLIC BLOOD PRESSURE: 82 MMHG

## 2023-08-23 DIAGNOSIS — E78.2 MIXED HYPERLIPIDEMIA: ICD-10-CM

## 2023-08-23 DIAGNOSIS — E66.01 CLASS 2 SEVERE OBESITY DUE TO EXCESS CALORIES WITH SERIOUS COMORBIDITY AND BODY MASS INDEX (BMI) OF 38.0 TO 38.9 IN ADULT (H): ICD-10-CM

## 2023-08-23 DIAGNOSIS — Z00.00 ROUTINE PHYSICAL EXAMINATION: Primary | ICD-10-CM

## 2023-08-23 DIAGNOSIS — E66.812 CLASS 2 SEVERE OBESITY DUE TO EXCESS CALORIES WITH SERIOUS COMORBIDITY AND BODY MASS INDEX (BMI) OF 38.0 TO 38.9 IN ADULT (H): ICD-10-CM

## 2023-08-23 DIAGNOSIS — R60.9 EDEMA, UNSPECIFIED TYPE: ICD-10-CM

## 2023-08-23 DIAGNOSIS — Z83.3 FAMILY HISTORY OF DIABETES MELLITUS: ICD-10-CM

## 2023-08-23 DIAGNOSIS — Z11.4 SCREENING FOR HIV (HUMAN IMMUNODEFICIENCY VIRUS): ICD-10-CM

## 2023-08-23 DIAGNOSIS — Z11.59 NEED FOR HEPATITIS C SCREENING TEST: ICD-10-CM

## 2023-08-23 DIAGNOSIS — G47.33 OSA (OBSTRUCTIVE SLEEP APNEA): ICD-10-CM

## 2023-08-23 DIAGNOSIS — F41.1 GENERALIZED ANXIETY DISORDER: ICD-10-CM

## 2023-08-23 LAB
ALBUMIN UR-MCNC: NEGATIVE MG/DL
APPEARANCE UR: CLEAR
BILIRUB UR QL STRIP: NEGATIVE
COLOR UR AUTO: YELLOW
ERYTHROCYTE [DISTWIDTH] IN BLOOD BY AUTOMATED COUNT: 12.2 % (ref 10–15)
GLUCOSE UR STRIP-MCNC: NEGATIVE MG/DL
HBA1C MFR BLD: 5.4 % (ref 0–5.6)
HCT VFR BLD AUTO: 44.6 % (ref 40–53)
HGB BLD-MCNC: 15.1 G/DL (ref 13.3–17.7)
HGB UR QL STRIP: NEGATIVE
KETONES UR STRIP-MCNC: NEGATIVE MG/DL
LEUKOCYTE ESTERASE UR QL STRIP: NEGATIVE
MCH RBC QN AUTO: 30.1 PG (ref 26.5–33)
MCHC RBC AUTO-ENTMCNC: 33.9 G/DL (ref 31.5–36.5)
MCV RBC AUTO: 89 FL (ref 78–100)
NITRATE UR QL: NEGATIVE
PH UR STRIP: 5 [PH] (ref 5–8)
PLATELET # BLD AUTO: 353 10E3/UL (ref 150–450)
RBC # BLD AUTO: 5.01 10E6/UL (ref 4.4–5.9)
SP GR UR STRIP: >=1.03 (ref 1–1.03)
UROBILINOGEN UR STRIP-ACNC: 0.2 E.U./DL
WBC # BLD AUTO: 11 10E3/UL (ref 4–11)

## 2023-08-23 PROCEDURE — 81003 URINALYSIS AUTO W/O SCOPE: CPT | Performed by: FAMILY MEDICINE

## 2023-08-23 PROCEDURE — 99395 PREV VISIT EST AGE 18-39: CPT | Performed by: FAMILY MEDICINE

## 2023-08-23 PROCEDURE — 80061 LIPID PANEL: CPT | Performed by: FAMILY MEDICINE

## 2023-08-23 PROCEDURE — 83036 HEMOGLOBIN GLYCOSYLATED A1C: CPT | Performed by: FAMILY MEDICINE

## 2023-08-23 PROCEDURE — 36415 COLL VENOUS BLD VENIPUNCTURE: CPT | Performed by: FAMILY MEDICINE

## 2023-08-23 PROCEDURE — 84443 ASSAY THYROID STIM HORMONE: CPT | Performed by: FAMILY MEDICINE

## 2023-08-23 PROCEDURE — 99214 OFFICE O/P EST MOD 30 MIN: CPT | Mod: 25 | Performed by: FAMILY MEDICINE

## 2023-08-23 PROCEDURE — 80053 COMPREHEN METABOLIC PANEL: CPT | Performed by: FAMILY MEDICINE

## 2023-08-23 PROCEDURE — 85027 COMPLETE CBC AUTOMATED: CPT | Performed by: FAMILY MEDICINE

## 2023-08-23 RX ORDER — HYDROXYZINE PAMOATE 25 MG/1
25 CAPSULE ORAL 3 TIMES DAILY PRN
Qty: 20 CAPSULE | Refills: 1 | Status: SHIPPED | OUTPATIENT
Start: 2023-08-23 | End: 2024-07-01

## 2023-08-23 ASSESSMENT — ANXIETY QUESTIONNAIRES
4. TROUBLE RELAXING: NEARLY EVERY DAY
7. FEELING AFRAID AS IF SOMETHING AWFUL MIGHT HAPPEN: MORE THAN HALF THE DAYS
GAD7 TOTAL SCORE: 13
5. BEING SO RESTLESS THAT IT IS HARD TO SIT STILL: NOT AT ALL
2. NOT BEING ABLE TO STOP OR CONTROL WORRYING: MORE THAN HALF THE DAYS
GAD7 TOTAL SCORE: 13
6. BECOMING EASILY ANNOYED OR IRRITABLE: SEVERAL DAYS
3. WORRYING TOO MUCH ABOUT DIFFERENT THINGS: MORE THAN HALF THE DAYS
1. FEELING NERVOUS, ANXIOUS, OR ON EDGE: NEARLY EVERY DAY

## 2023-08-23 ASSESSMENT — PATIENT HEALTH QUESTIONNAIRE - PHQ9
SUM OF ALL RESPONSES TO PHQ QUESTIONS 1-9: 14
SUM OF ALL RESPONSES TO PHQ QUESTIONS 1-9: 14

## 2023-08-23 ASSESSMENT — PAIN SCALES - GENERAL: PAINLEVEL: NO PAIN (0)

## 2023-08-23 ASSESSMENT — ENCOUNTER SYMPTOMS: NERVOUS/ANXIOUS: 1

## 2023-08-23 NOTE — PROGRESS NOTES
Assessment/Plan:     Routine physical examination  Encouraged healthy lifestyle habits including regular exercise, healthy eating habits, and adequate calcium and vitamin D intake.  We will screen for diabetes today with fasting glucose and A1c.  We will screen for dyslipidemia.  He has received hepatitis B vaccine series previously.  He has donated blood previously and has no risk factor for HIV and hepatitis C, declines screening.  - Lipid panel reflex to direct LDL Fasting; Future  - Lipid panel reflex to direct LDL Fasting    Mixed hyperlipidemia  Courage efforts at healthy lifestyle habits.  We will check fasting lipids today.  - Lipid panel reflex to direct LDL Fasting; Future  - Lipid panel reflex to direct LDL Fasting    JOHN (obstructive sleep apnea)  Judith compliant with CPAP but has not had follow-up in over 5 years, has gained weight in the interim as well.  I recommended establishment with sleep clinic for monitoring, especially in light of recent sleep struggles which I suspect are more related to anxiety.  - Adult Sleep Eval & Management  Referral; Future    Screening for HIV (human immunodeficiency virus)  Low risk, has been screened previously, declines screening.    Need for hepatitis C screening test  Low risk, has been screened previously, declined screening.    Edema, unspecified type  Discussed broad differential diagnosis.  He is not having any symptoms that would be related to cardiovascular disease, I do not feel EKG or echocardiogram would be necessary at this time.  Cardiopulmonary exam unremarkable.  Will assess further with comprehensive metabolic panel, TSH, CBC, and will obtain urinalysis to assess for proteinuria.  Further follow-up and recommendations pending results.  - TSH with free T4 reflex; Future  - Comprehensive metabolic panel; Future  - UA Macroscopic with reflex to Microscopic and Culture; Future  - CBC with platelets; Future  - TSH with free T4 reflex  -  Comprehensive metabolic panel  - CBC with platelets  - UA Macroscopic with reflex to Microscopic and Culture    Family history of diabetes mellitus  Encourage efforts at healthy lifestyle habits.  We will check fasting glucose and A1c today.  - Hemoglobin A1c; Future  - Hemoglobin A1c    Class 2 severe obesity due to excess calories with serious comorbidity of obstructive sleep apnea and hyperlipidemia and body mass index (BMI) of 38.0 to 38.9 in adult (H)  Encouraged efforts at healthy lifestyle habits.    Generalized anxiety disorder  I offer my support.  Continue cognitive behavioral therapy.  Discussed medications that are taken daily versus as needed medications.  Will initiate both with sertraline 50 mg daily and hydroxyzine as needed.  Notify me of side effects.  Follow-up with me in 6 to 8 weeks to reassess.  In person or virtual would be okay.    - sertraline (ZOLOFT) 50 MG tablet; Take 1 tablet (50 mg) by mouth daily  - hydrOXYzine (VISTARIL) 25 MG capsule; Take 1 capsule (25 mg) by mouth 3 times daily as needed for anxiety     There are no Patient Instructions on file for this visit.     No follow-ups on file.         Subjective:     Ishmael Petersen is a 38 year old male who presents for an annual exam.     Here today for routine preventive care visit.  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  Initially had been working from home and since July has been back in the office.  Finds that his anxiety is generalized, sometimes related to work or home but mostly related to factors outside of that such as politics, wondering if he is good enough both as an employee and does have father, contemplation of what will happen when 1 dies, and sometimes will notice more of a sadness but it seems to be more about future possibilities of a tragedy were to occur rather than depression.   Sometimes having difficulty falling asleep or even going to bed because he will feel anxiety, sometimes will even feel anxiety in his extremities as he is experiencing currently.  Denies any palpitations or heart racing.  He has been using some logic and rationale to work through his anxiety, sometimes will allow the anxiety, other times will use distraction.  Seeing a cognitive behavioral therapist that has been working with for over 2 years now.  After discussion, therapist recommended consideration of medication.    Patient is noting bilateral foot swelling that seems to be present off and on over the past month or so.  No pain.  Denies any overt shortness of breath, orthopnea, or paroxysmal nocturnal dyspnea.  No chest pain or palpitations, remote history of palpitations with an unremarkable Holter monitor.  No new medications or supplements.  No injuries.  Seems symmetric.    History of obstructive sleep apnea, using CPAP consistently, has not been followed up by the sleep clinic in many months.  History of dyslipidemia due for follow-up.  Type 2 diabetes in his brother, due for screening.  Has sumatriptan as needed for migraines.    He is .  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  No dedicated exercise though he walks 20,000-25,000 steps per day at work.  Some fruits and vegetables, admits that he has been eating less at home and is more sporadic with eating at work due to time and stress.    Healthy Habits:     Getting at least 3 servings of Calcium per day:  NO    Bi-annual eye exam:  NO    Dental care twice a year:  NO    Sleep apnea or symptoms of sleep apnea:  Sleep apnea    Diet:  Regular (no restrictions)    Frequency of exercise:  None    Taking medications regularly:  Yes    Medication side effects:  None    Additional concerns today:  Yes  Anxiety         Healthy Habits:   Healthy Diet: No  Regular Exercise: No      Health Maintenance reviewed:  Lipid Profile: Due today  Glucose Screen:  Due today  Colonoscopy: N/A      Immunization History   Administered Date(s) Administered    COVID-19 Bivalent 12+ (Pfizer) 10/03/2022    COVID-19 Monovalent 18+ (Moderna) 03/25/2021, 04/22/2021, 11/29/2021    Flu, Unspecified 10/06/2009, 09/19/2011, 09/24/2019    Influenza Vaccine >6 months (Alfuria,Fluzone) 09/24/2019, 10/27/2020, 10/03/2022    Influenza Vaccine, 6+MO IM (QUADRIVALENT W/PRESERVATIVES) 10/20/2015    MMR 04/04/1997, 05/02/1997    TD,PF 7+ (Tenivac) 12/24/2019    TDAP (Adacel,Boostrix) 10/06/2009    Td (Adult), Adsorbed 04/04/1997     Immunization status: Up-to-date    Current Outpatient Medications   Medication Sig Dispense Refill    cetirizine 10 mg TbDL [CETIRIZINE 10 MG TBDL] Take by mouth.      cyclobenzaprine (FLEXERIL) 10 MG tablet Take 1 tablet (10 mg) by mouth 3 times daily as needed for muscle spasms 30 tablet 0    hydrOXYzine (VISTARIL) 25 MG capsule Take 1 capsule (25 mg) by mouth 3 times daily as needed for anxiety 20 capsule 1    ketoconazole (NIZORAL) 2 % external shampoo [KETOCONAZOLE (NIZORAL) 2 % SHAMPOO] APPLY TO DAMP SKIN, LATHER, LEAVE ON 5 MINUTES, AND RINSE 120 mL 3    sertraline (ZOLOFT) 50 MG tablet Take 1 tablet (50 mg) by mouth daily 90 tablet 1    SUMAtriptan (IMITREX) 25 MG tablet Take 1 tablet (25 mg) by mouth every 2 hours as needed for migraine 10 tablet 3     Past Medical History:   Diagnosis Date    Arthritis     Depression     Hypertension     Sleep apnea      Past Surgical History:   Procedure Laterality Date    ARTHROSCOPY KNEE Left     VASECTOMY       Aspartame  Family History   Problem Relation Age of Onset    Hypertension Mother     Diabetes Brother         type 1 diabetes    Diabetes Paternal Grandmother     Seizure Disorder Daughter      Social History     Socioeconomic History    Marital status:      Spouse name: Not on file    Number of children: Not on file    Years of education: Not on file    Highest education level: Not on file   Occupational  "History    Not on file   Tobacco Use    Smoking status: Never    Smokeless tobacco: Never   Vaping Use    Vaping Use: Never used   Substance and Sexual Activity    Alcohol use: Yes     Comment: one/month    Drug use: Never    Sexual activity: Yes     Partners: Female     Birth control/protection: Male Surgical     Comment:    Other Topics Concern    Parent/sibling w/ CABG, MI or angioplasty before 65F 55M? No   Social History Narrative    Not on file     Social Determinants of Health     Financial Resource Strain: Not on file   Food Insecurity: Not on file   Transportation Needs: Not on file   Physical Activity: Not on file   Stress: Not on file   Social Connections: Not on file   Intimate Partner Violence: Not on file   Housing Stability: Not on file       Review of Systems  General:  Denies problem  Eyes: Denies problem  Ears/Nose/Throat: Denies problem  Cardiovascular: Denies problem  Respiratory:  Denies problem  Gastrointestinal:  Denies problem   Genitourinary: Denies problem  Musculoskeletal:  Denies problem  Skin: Denies problem  Neurologic: Denies problem  Psychiatric: Denies problem  Endocrine: Denies problem  Heme/Lymphatic: Denies problem   Allergic/Immunologic: Denies problem           Objective:        Vitals:    08/23/23 1420   BP: 134/82   Pulse: 79   Resp: 18   Temp: 98.1  F (36.7  C)   TempSrc: Oral   SpO2: 97%   Weight: 120.4 kg (265 lb 8 oz)   Height: 1.765 m (5' 9.5\")   PainSc: No Pain (0)     Body mass index is 38.65 kg/m .    Physical Exam:  General Appearance: Alert, pleasant, appears stated age  Head: Normocephalic, without obvious abnormality  Eyes: PERRL, conjunctiva/corneas clear, EOM's intact  Ears: Normal TM's and external ear canals, both ears  Nose: Nares normal, septum midline,mucosa normal, no drainage  Throat: Lips, mucosa, and tongue normal; teeth and gums normal; oropharynx is clear  Neck: Supple,without lymphadenopathy or thyromegally  Lungs: Clear to auscultation " bilaterally, respirations unlabored  Heart: Regular rate and rhythm, no murmur   Abdomen: Soft, non-tender, no masses, no organomegaly  Extremities: Extremities with strong and symmetric pulses, no cyanosis or edema  Skin: Skin color, texture normal, no rashes or lesions  Neurologic: Normal   Psych: Normal affect.  Normal psychomotor activities.  Seems to have good insight.  Thought processes and judgment intact.             This note has been dictated using voice recognition software. Any grammatical or context distortions are unintentional and inherent to the the software.        Answers submitted by the patient for this visit:  Patient Health Questionnaire (Submitted on 8/23/2023)  PHQ9 TOTAL SCORE: 14  AVA-7 (Submitted on 8/23/2023)  AVA 7 TOTAL SCORE: 13  Annual Preventive Visit (Submitted on 8/20/2023)  Chief Complaint: Annual Exam:  Frequency of exercise:: None  Getting at least 3 servings of Calcium per day:: NO  Diet:: Regular (no restrictions)  Taking medications regularly:: Yes  Medication side effects:: None  Bi-annual eye exam:: NO  Dental care twice a year:: NO  Sleep apnea or symptoms of sleep apnea:: Sleep apnea  abdominal pain: No  Blood in stool: No  Blood in urine: No  chest pain: No  chills: No  congestion: No  constipation: No  cough: No  diarrhea: No  dizziness: No  ear pain: No  eye pain: No  nervous/anxious: Yes  fever: No  frequency: No  genital sores: No  headaches: No  hearing loss: No  heartburn: Yes  arthralgias: Yes  joint swelling: Yes  peripheral edema: Yes  mood changes: No  myalgias: No  nausea: No  dysuria: No  palpitations: No  Skin sensation changes: No  sore throat: No  urgency: No  rash: No  shortness of breath: Yes  visual disturbance: No  weakness: No  impotence: No  penile discharge: No  Additional concerns today:: Yes

## 2023-08-24 LAB
ALBUMIN SERPL BCG-MCNC: 4.7 G/DL (ref 3.5–5.2)
ALP SERPL-CCNC: 122 U/L (ref 40–129)
ALT SERPL W P-5'-P-CCNC: 50 U/L (ref 0–70)
ANION GAP SERPL CALCULATED.3IONS-SCNC: 14 MMOL/L (ref 7–15)
AST SERPL W P-5'-P-CCNC: 37 U/L (ref 0–45)
BILIRUB SERPL-MCNC: 0.5 MG/DL
BUN SERPL-MCNC: 12.7 MG/DL (ref 6–20)
CALCIUM SERPL-MCNC: 9.8 MG/DL (ref 8.6–10)
CHLORIDE SERPL-SCNC: 102 MMOL/L (ref 98–107)
CHOLEST SERPL-MCNC: 214 MG/DL
CREAT SERPL-MCNC: 1.05 MG/DL (ref 0.67–1.17)
DEPRECATED HCO3 PLAS-SCNC: 23 MMOL/L (ref 22–29)
GFR SERPL CREATININE-BSD FRML MDRD: >90 ML/MIN/1.73M2
GLUCOSE SERPL-MCNC: 86 MG/DL (ref 70–99)
HDLC SERPL-MCNC: 38 MG/DL
LDLC SERPL CALC-MCNC: 138 MG/DL
NONHDLC SERPL-MCNC: 176 MG/DL
POTASSIUM SERPL-SCNC: 4.2 MMOL/L (ref 3.4–5.3)
PROT SERPL-MCNC: 7.7 G/DL (ref 6.4–8.3)
SODIUM SERPL-SCNC: 139 MMOL/L (ref 136–145)
TRIGL SERPL-MCNC: 192 MG/DL
TSH SERPL DL<=0.005 MIU/L-ACNC: 1.2 UIU/ML (ref 0.3–4.2)

## 2023-10-15 ENCOUNTER — MYC REFILL (OUTPATIENT)
Dept: PHYSICAL MEDICINE AND REHAB | Facility: CLINIC | Age: 38
End: 2023-10-15
Payer: COMMERCIAL

## 2023-10-15 DIAGNOSIS — M79.18 MYOFASCIAL PAIN: ICD-10-CM

## 2023-10-15 RX ORDER — CYCLOBENZAPRINE HCL 10 MG
10 TABLET ORAL 3 TIMES DAILY PRN
Qty: 30 TABLET | Refills: 0 | Status: CANCELLED | OUTPATIENT
Start: 2023-10-15

## 2023-10-16 NOTE — TELEPHONE ENCOUNTER
Pharmacy sent refill request for flexeril   --Med last Rx 1/20/23 #30, 0 refill   --Last OV 5/26/22   --Future appt: none     Patient was last seen 1 year ago and will need to schedule a follow up with Floridalma Franco.

## 2023-12-12 ENCOUNTER — VIRTUAL VISIT (OUTPATIENT)
Dept: FAMILY MEDICINE | Facility: CLINIC | Age: 38
End: 2023-12-12
Payer: COMMERCIAL

## 2023-12-12 DIAGNOSIS — F41.1 GENERALIZED ANXIETY DISORDER: Primary | ICD-10-CM

## 2023-12-12 DIAGNOSIS — G47.33 OSA (OBSTRUCTIVE SLEEP APNEA): ICD-10-CM

## 2023-12-12 DIAGNOSIS — K21.9 GASTROESOPHAGEAL REFLUX DISEASE, UNSPECIFIED WHETHER ESOPHAGITIS PRESENT: ICD-10-CM

## 2023-12-12 DIAGNOSIS — J30.9 ALLERGIC RHINITIS, UNSPECIFIED SEASONALITY, UNSPECIFIED TRIGGER: ICD-10-CM

## 2023-12-12 DIAGNOSIS — M79.18 MYOFASCIAL PAIN: ICD-10-CM

## 2023-12-12 PROCEDURE — 99214 OFFICE O/P EST MOD 30 MIN: CPT | Mod: VID | Performed by: NURSE PRACTITIONER

## 2023-12-12 RX ORDER — CYCLOBENZAPRINE HCL 10 MG
10 TABLET ORAL 3 TIMES DAILY PRN
Qty: 30 TABLET | Refills: 0 | Status: SHIPPED | OUTPATIENT
Start: 2023-12-12 | End: 2024-06-16

## 2023-12-12 RX ORDER — SERTRALINE HYDROCHLORIDE 100 MG/1
100 TABLET, FILM COATED ORAL DAILY
Qty: 90 TABLET | Refills: 1 | Status: SHIPPED | OUTPATIENT
Start: 2023-12-12 | End: 2024-07-01

## 2023-12-12 ASSESSMENT — ANXIETY QUESTIONNAIRES
IF YOU CHECKED OFF ANY PROBLEMS ON THIS QUESTIONNAIRE, HOW DIFFICULT HAVE THESE PROBLEMS MADE IT FOR YOU TO DO YOUR WORK, TAKE CARE OF THINGS AT HOME, OR GET ALONG WITH OTHER PEOPLE: SOMEWHAT DIFFICULT
6. BECOMING EASILY ANNOYED OR IRRITABLE: SEVERAL DAYS
4. TROUBLE RELAXING: NEARLY EVERY DAY
1. FEELING NERVOUS, ANXIOUS, OR ON EDGE: MORE THAN HALF THE DAYS
2. NOT BEING ABLE TO STOP OR CONTROL WORRYING: MORE THAN HALF THE DAYS
3. WORRYING TOO MUCH ABOUT DIFFERENT THINGS: SEVERAL DAYS
GAD7 TOTAL SCORE: 10
7. FEELING AFRAID AS IF SOMETHING AWFUL MIGHT HAPPEN: SEVERAL DAYS
GAD7 TOTAL SCORE: 10
5. BEING SO RESTLESS THAT IT IS HARD TO SIT STILL: NOT AT ALL

## 2023-12-12 NOTE — PROGRESS NOTES
Ishmael is a 38 year old who is being evaluated via a billable video visit.      How would you like to obtain your AVS? MyChart  If the video visit is dropped, the invitation should be resent by: Text to cell phone: [phone]  Will anyone else be joining your video visit? No          Generalized anxiety disorder  Discussed treatment options.  Will increase sertraline to 100 mg daily.  Educated on its indications and side effects.  I encouraged follow-up with Dr. Edgar in 1 month.  - sertraline (ZOLOFT) 100 MG tablet  Dispense: 90 tablet; Refill: 1    Myofascial pain  Patient continues cyclobenzaprine as needed. He is to avoid taking this with other sedatives.   - cyclobenzaprine (FLEXERIL) 10 MG tablet  Dispense: 30 tablet; Refill: 0    Gastroesophageal reflux disease, unspecified whether esophagitis present  Will start omeprazole 20 mg daily.  Educated on its indications and side effects.  - omeprazole (PRILOSEC) 20 MG DR capsule  Dispense: 90 capsule; Refill: 0    Allergic rhinitis, unspecified seasonality, unspecified trigger  Will refer to allergist.  He continues cetirizine.  - Adult Allergy/Asthma  Referral    JOHN (obstructive sleep apnea)  Will refer to sleep center.  - Adult Sleep Eval & Management  Referral        Subjective   Ishmael is a 38 year old, presenting for the following health issues:    Patient presents with multiple concerns today.  Patient is taking sertraline 50 mg daily for anxiety.  He would like to increase the dose.  He is not taking hydroxyzine 2 times per week due to worsening anxiety.  He experiences anxiety when he anticipates the work week or when he is in social situations.  He does have some interrupted sleep.  When he is nervous, he experiences an uneasy stomach, his heart races, and he has tingling sensation within his fingers.  He denies thoughts of suicide.  Patient has a history of esophageal reflux.  Since his teenage years, he feels as though  food sits in his stomach.  This occurs once per month.  He denies nausea, vomiting, constipation, diarrhea.  Patient takes cyclobenzaprine as needed for muscle spasms within his low back.  He was evaluated by the spine clinic in the past.  He has JOHN which was diagnosed 15 years ago.  He uses a CPAP.  He would like to see a sleep specialist again.  Patient also request referral for allergy testing.  He complains of environmental and food allergies. He requests allergy testing.       Follow Up and Medication Refill      12/12/2023     9:24 AM   Additional Questions   Roomed by Natty       Medication Refill    History of Present Illness       Mental Health Follow-up:  Patient presents to follow-up on Anxiety.    Patient's anxiety since last visit has been:  Medium  The patient is having other symptoms associated with anxiety.  Any significant life events: No  Patient is feeling anxious or having panic attacks.  Patient has no concerns about alcohol or drug use.    He eats 0-1 servings of fruits and vegetables daily.He consumes 2 sweetened beverage(s) daily.He exercises with enough effort to increase his heart rate 9 or less minutes per day.  He exercises with enough effort to increase his heart rate 3 or less days per week. He is missing 1 dose(s) of medications per week.  He is not taking prescribed medications regularly due to remembering to take.         Review of Systems   Constitutional, HEENT, cardiovascular, pulmonary, gi and gu systems are negative, except as otherwise noted.      Objective           Vitals:  No vitals were obtained today due to virtual visit.    Physical Exam   GENERAL: Healthy, alert and no distress  EYES: Eyes grossly normal to inspection.  No discharge or erythema, or obvious scleral/conjunctival abnormalities.  RESP: No audible wheeze, cough, or visible cyanosis.  No visible retractions or increased work of breathing.    SKIN: Visible skin clear. No significant rash, abnormal  pigmentation or lesions.  NEURO: Cranial nerves grossly intact.  Mentation and speech appropriate for age.  PSYCH: Mentation appears normal, affect normal/bright, judgement and insight intact, normal speech and appearance well-groomed.          Video-Visit Details    Type of service:  Video Visit     Originating Location (pt. Location): Home    Distant Location (provider location):  On-site  Platform used for Video Visit: Mixers

## 2024-03-11 DIAGNOSIS — K21.9 GASTROESOPHAGEAL REFLUX DISEASE, UNSPECIFIED WHETHER ESOPHAGITIS PRESENT: ICD-10-CM

## 2024-04-10 ASSESSMENT — SLEEP AND FATIGUE QUESTIONNAIRES
HOW LIKELY ARE YOU TO NOD OFF OR FALL ASLEEP WHILE SITTING AND READING: WOULD NEVER DOZE
HOW LIKELY ARE YOU TO NOD OFF OR FALL ASLEEP WHEN YOU ARE A PASSENGER IN A CAR FOR AN HOUR WITHOUT A BREAK: WOULD NEVER DOZE
HOW LIKELY ARE YOU TO NOD OFF OR FALL ASLEEP IN A CAR, WHILE STOPPED FOR A FEW MINUTES IN TRAFFIC: WOULD NEVER DOZE
HOW LIKELY ARE YOU TO NOD OFF OR FALL ASLEEP WHILE LYING DOWN TO REST IN THE AFTERNOON WHEN CIRCUMSTANCES PERMIT: SLIGHT CHANCE OF DOZING
HOW LIKELY ARE YOU TO NOD OFF OR FALL ASLEEP WHILE WATCHING TV: WOULD NEVER DOZE
HOW LIKELY ARE YOU TO NOD OFF OR FALL ASLEEP WHILE SITTING QUIETLY AFTER LUNCH WITHOUT ALCOHOL: WOULD NEVER DOZE
HOW LIKELY ARE YOU TO NOD OFF OR FALL ASLEEP WHILE SITTING AND TALKING TO SOMEONE: WOULD NEVER DOZE
HOW LIKELY ARE YOU TO NOD OFF OR FALL ASLEEP WHILE SITTING INACTIVE IN A PUBLIC PLACE: WOULD NEVER DOZE

## 2024-04-11 ENCOUNTER — OFFICE VISIT (OUTPATIENT)
Dept: SLEEP MEDICINE | Facility: CLINIC | Age: 39
End: 2024-04-11
Attending: NURSE PRACTITIONER
Payer: COMMERCIAL

## 2024-04-11 VITALS
HEIGHT: 71 IN | HEART RATE: 59 BPM | WEIGHT: 264.25 LBS | OXYGEN SATURATION: 95 % | BODY MASS INDEX: 36.99 KG/M2 | SYSTOLIC BLOOD PRESSURE: 144 MMHG | DIASTOLIC BLOOD PRESSURE: 81 MMHG

## 2024-04-11 DIAGNOSIS — Z86.69 HISTORY OF OBSTRUCTIVE SLEEP APNEA: Primary | ICD-10-CM

## 2024-04-11 DIAGNOSIS — E66.812 CLASS 2 SEVERE OBESITY DUE TO EXCESS CALORIES WITH SERIOUS COMORBIDITY AND BODY MASS INDEX (BMI) OF 37.0 TO 37.9 IN ADULT (H): ICD-10-CM

## 2024-04-11 DIAGNOSIS — E66.01 CLASS 2 SEVERE OBESITY DUE TO EXCESS CALORIES WITH SERIOUS COMORBIDITY AND BODY MASS INDEX (BMI) OF 37.0 TO 37.9 IN ADULT (H): ICD-10-CM

## 2024-04-11 DIAGNOSIS — G47.10 HYPERSOMNIA: ICD-10-CM

## 2024-04-11 DIAGNOSIS — R06.83 SNORING: ICD-10-CM

## 2024-04-11 PROCEDURE — 99204 OFFICE O/P NEW MOD 45 MIN: CPT | Performed by: INTERNAL MEDICINE

## 2024-04-11 NOTE — NURSING NOTE
"Chief Complaint   Patient presents with    Consult     Establish care for CPAP       Initial BP (!) 144/81   Pulse 59   Ht 1.791 m (5' 10.5\")   Wt 119.9 kg (264 lb 4 oz)   SpO2 95%   BMI 37.38 kg/m   Estimated body mass index is 37.38 kg/m  as calculated from the following:    Height as of this encounter: 1.791 m (5' 10.5\").    Weight as of this encounter: 119.9 kg (264 lb 4 oz).    Medication Reconciliation: complete    Neck circumference: 17 inches / 43 centimeters.    DME:     Malorie Guerrero MA       "

## 2024-04-11 NOTE — PATIENT INSTRUCTIONS
"Please fax us the original sleep study. I will put in the order for the in lab titration study after we have scanned it into our charts.    SLEEP HYGIENE    Sleep only as much as you need to feel rested and then get out of bed   Keep a regular sleep schedule   Avoid forcing sleep   Exercise regularly for at least 20 minutes, preferably 4 to 5 hours before bedtime   Avoid caffeinated beverages after lunch   Avoid alcohol near bedtime: no \"night cap\"   Avoid smoking, especially in the evening   Do not go to bed hungry   Adjust bedroom environment   Avoid prolonged use of light-emitting screens before bedtime    Deal with your worries before bedtime       "
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
## 2024-04-15 NOTE — NURSING NOTE
MIKE signed and faxed to [facility] at [phone]. MIKE scanned into chart.   Malorie Guerrero MA

## 2024-05-03 ENCOUNTER — OFFICE VISIT (OUTPATIENT)
Dept: ALLERGY | Facility: CLINIC | Age: 39
End: 2024-05-03
Attending: NURSE PRACTITIONER
Payer: COMMERCIAL

## 2024-05-03 VITALS
OXYGEN SATURATION: 97 % | RESPIRATION RATE: 20 BRPM | WEIGHT: 262.2 LBS | HEART RATE: 72 BPM | BODY MASS INDEX: 37.09 KG/M2

## 2024-05-03 DIAGNOSIS — J30.1 SEASONAL ALLERGIC RHINITIS DUE TO POLLEN: ICD-10-CM

## 2024-05-03 DIAGNOSIS — J45.20 MILD INTERMITTENT ASTHMA WITHOUT COMPLICATION: ICD-10-CM

## 2024-05-03 DIAGNOSIS — J30.81 ALLERGIC RHINITIS DUE TO ANIMALS: Primary | ICD-10-CM

## 2024-05-03 DIAGNOSIS — J30.89 ALLERGIC RHINITIS DUE TO DUST MITE: ICD-10-CM

## 2024-05-03 PROCEDURE — 95004 PERQ TESTS W/ALRGNC XTRCS: CPT | Performed by: ALLERGY & IMMUNOLOGY

## 2024-05-03 PROCEDURE — 99244 OFF/OP CNSLTJ NEW/EST MOD 40: CPT | Mod: 25 | Performed by: ALLERGY & IMMUNOLOGY

## 2024-05-03 RX ORDER — AZELASTINE 1 MG/ML
2 SPRAY, METERED NASAL 2 TIMES DAILY
Qty: 30 ML | Refills: 3 | Status: SHIPPED | OUTPATIENT
Start: 2024-05-03

## 2024-05-03 RX ORDER — ALBUTEROL SULFATE 90 UG/1
2 AEROSOL, METERED RESPIRATORY (INHALATION) EVERY 6 HOURS PRN
Qty: 18 G | Refills: 1 | Status: SHIPPED | OUTPATIENT
Start: 2024-05-03

## 2024-05-03 NOTE — PATIENT INSTRUCTIONS
Dust mite control    Wash bedding weekly, covers, keep humidity <50%    Air purifier    Kids change clothes    Cetirizine 10 mg    Astelin (Astepro) as needed    Albuterol as needed--notify if using more than a few times per week    Allergy shots?

## 2024-05-03 NOTE — LETTER
5/3/2024         RE: Ishmael Petersen  [address] [place] [address]        Dear Colleague,    Thank you for referring your patient, Ishmael Petersen, to the [facility]. Please see a copy of my visit note below.          Subjective  Ishmael is a 38 year old, presenting for the following health issues:  Allergy Consult (Concern for food (gluten, dairy, aspartame) and animal allergies)    HPI   Chief complaint: Concern for food allergy and animal allergies    History of present illness: This is a pleasant 38-year-old gentleman that I was asked to see for evaluation of allergies by Jessica Farrar.  Patient states that he has a longstanding history of allergies in fact was seen in 2021 by me.  At that time specific IgE to dog and cat were negative.  He does note, however, when his children returned from his ex's home, he will notice itchy eyes watery eyes sneezing and drainage.  They have cats at their other home.  He thinks he is allergic to cats.  He states he was on allergy shots in 2009 and 10 but had an error in dosing and this caused an anaphylactic reaction.  He did not require epinephrine, however.  He notes that he is also allergic to dust and grass.  He states previously dust and grass would bother his symptoms especially if he was mowing the lawn for example.  He would note some breathlessness.  This breathlessness happens as well when he is around cats.  He does not have any inhalers at home.  He does use Zyrtec currently for his symptoms which helps but does not completely alleviate symptoms.              Objective   Pulse 72   Resp 20   Wt 118.9 kg (262 lb 3.2 oz)   SpO2 97%   BMI 37.09 kg/m    Body mass index is 37.09 kg/m .  Physical Exam   Gen: Pleasant male not in acute distress  HEENT: Eyes no erythema of the bulbar or palpebral conjunctiva, no edema.. Nose: No congestion, mucosa normal. Mouth: Throat clear, no lip or tongue edema.   Respiratory:  Clear to auscultation bilaterally, no adventitious breath sounds  Skin: No rashes or lesions  Psych: Alert and oriented times 3      At today s visit the patient/parent and I engaged in an informed consent discussion about allergy testing.  We discussed skin testing, blood testing,  and the alternative of not undergoing any testing. The patient/ parent has a preference for skin testing. We then discussed the risks and benefits of skin testing.  The patient/ parent understands skin testing risks can include, but are not limited to, urticaria, angioedema, shortness of breath, and severe anaphylaxis.  The benefits include, but are not limited, to evaluation for allergens causing symptoms.  After answering the patients/parents questions they have agreed to proceed with skin testing.    30 percutaneous test were placed to the environmental skin test panel.  Positive histamine control with positive test to dust mites grass pollen weed pollen and cats.  Please see scanned photograph.    Impression report and plan:  1.  Allergic rhinitis  2.  Intermittent asthma     History is consistent with allergen induced asthma.  Reviewed environmental control.  Recommend cetirizine daily, Astelin nasal spray 2 sprays each nostril twice daily and albuterol inhaler to have on hand.  If needing albuterol more than a few times per week, he should notify.  He is a candidate for allergy shots.  I went over the risks and benefits of allergy shots.  I stated risks include hives, swelling, shortness of breath.  I did state that one in 2.5 million shot administrations can result in death.  I stated they must wait in the office for 30 minutes following the shot and carry an epinephrine device on the day of the shot.  I stated that shots are effective in about 90% of patients.  I stated that they should check with the insurance company prior to proceeding.  They understand the risks and benefits and will let me know if he would like to proceed.   Consent forms were not signed.  Otherwise follow as needed.        Signed Electronically by: Michelle GRIJALVA MD      Again, thank you for allowing me to participate in the care of your patient.        Sincerely,        Michelle GRIJALVA MD

## 2024-05-03 NOTE — PROGRESS NOTES
Verna Quiñones is a 38 year old, presenting for the following health issues:  Allergy Consult (Concern for food (gluten, dairy, aspartame) and animal allergies)    HPI   Chief complaint: Concern for food allergy and animal allergies    History of present illness: This is a pleasant 38-year-old gentleman that I was asked to see for evaluation of allergies by Jessica Farrar.  Patient states that he has a longstanding history of allergies in fact was seen in 2021 by me.  At that time specific IgE to dog and cat were negative.  He does note, however, when his children returned from his ex's home, he will notice itchy eyes watery eyes sneezing and drainage.  They have cats at their other home.  He thinks he is allergic to cats.  He states he was on allergy shots in 2009 and 10 but had an error in dosing and this caused an anaphylactic reaction.  He did not require epinephrine, however.  He notes that he is also allergic to dust and grass.  He states previously dust and grass would bother his symptoms especially if he was mowing the lawn for example.  He would note some breathlessness.  This breathlessness happens as well when he is around cats.  He does not have any inhalers at home.  He does use Zyrtec currently for his symptoms which helps but does not completely alleviate symptoms.              Objective    Pulse 72   Resp 20   Wt 118.9 kg (262 lb 3.2 oz)   SpO2 97%   BMI 37.09 kg/m    Body mass index is 37.09 kg/m .  Physical Exam   Gen: Pleasant male not in acute distress  HEENT: Eyes no erythema of the bulbar or palpebral conjunctiva, no edema.. Nose: No congestion, mucosa normal. Mouth: Throat clear, no lip or tongue edema.   Respiratory: Clear to auscultation bilaterally, no adventitious breath sounds  Skin: No rashes or lesions  Psych: Alert and oriented times 3      At today s visit the patient/parent and I engaged in an informed consent discussion about allergy testing.  We discussed skin  testing, blood testing,  and the alternative of not undergoing any testing. The patient/ parent has a preference for skin testing. We then discussed the risks and benefits of skin testing.  The patient/ parent understands skin testing risks can include, but are not limited to, urticaria, angioedema, shortness of breath, and severe anaphylaxis.  The benefits include, but are not limited, to evaluation for allergens causing symptoms.  After answering the patients/parents questions they have agreed to proceed with skin testing.    30 percutaneous test were placed to the environmental skin test panel.  Positive histamine control with positive test to dust mites grass pollen weed pollen and cats.  Please see scanned photograph.    Impression report and plan:  1.  Allergic rhinitis  2.  Intermittent asthma     History is consistent with allergen induced asthma.  Reviewed environmental control.  Recommend cetirizine daily, Astelin nasal spray 2 sprays each nostril twice daily and albuterol inhaler to have on hand.  If needing albuterol more than a few times per week, he should notify.  He is a candidate for allergy shots.  I went over the risks and benefits of allergy shots.  I stated risks include hives, swelling, shortness of breath.  I did state that one in 2.5 million shot administrations can result in death.  I stated they must wait in the office for 30 minutes following the shot and carry an epinephrine device on the day of the shot.  I stated that shots are effective in about 90% of patients.  I stated that they should check with the insurance company prior to proceeding.  They understand the risks and benefits and will let me know if he would like to proceed.  Consent forms were not signed.  Otherwise follow as needed.        Signed Electronically by: Michelle GRIJALVA MD

## 2024-06-15 DIAGNOSIS — M79.18 MYOFASCIAL PAIN: ICD-10-CM

## 2024-06-16 RX ORDER — CYCLOBENZAPRINE HCL 10 MG
10 TABLET ORAL 3 TIMES DAILY PRN
Qty: 30 TABLET | Refills: 0 | Status: SHIPPED | OUTPATIENT
Start: 2024-06-16

## 2024-07-01 ENCOUNTER — VIRTUAL VISIT (OUTPATIENT)
Dept: FAMILY MEDICINE | Facility: CLINIC | Age: 39
End: 2024-07-01
Payer: COMMERCIAL

## 2024-07-01 DIAGNOSIS — F41.1 GENERALIZED ANXIETY DISORDER: ICD-10-CM

## 2024-07-01 PROCEDURE — 99214 OFFICE O/P EST MOD 30 MIN: CPT | Mod: 95 | Performed by: FAMILY MEDICINE

## 2024-07-01 RX ORDER — SERTRALINE HYDROCHLORIDE 100 MG/1
150 TABLET, FILM COATED ORAL DAILY
Qty: 135 TABLET | Refills: 1 | Status: SHIPPED | OUTPATIENT
Start: 2024-07-01 | End: 2024-07-30

## 2024-07-01 RX ORDER — HYDROXYZINE PAMOATE 25 MG/1
25 CAPSULE ORAL 3 TIMES DAILY PRN
Qty: 20 CAPSULE | Refills: 1 | Status: SHIPPED | OUTPATIENT
Start: 2024-07-01

## 2024-07-01 ASSESSMENT — PATIENT HEALTH QUESTIONNAIRE - PHQ9
SUM OF ALL RESPONSES TO PHQ QUESTIONS 1-9: 8
SUM OF ALL RESPONSES TO PHQ QUESTIONS 1-9: 8
10. IF YOU CHECKED OFF ANY PROBLEMS, HOW DIFFICULT HAVE THESE PROBLEMS MADE IT FOR YOU TO DO YOUR WORK, TAKE CARE OF THINGS AT HOME, OR GET ALONG WITH OTHER PEOPLE: SOMEWHAT DIFFICULT

## 2024-07-01 ASSESSMENT — ANXIETY QUESTIONNAIRES
2. NOT BEING ABLE TO STOP OR CONTROL WORRYING: MORE THAN HALF THE DAYS
GAD7 TOTAL SCORE: 11
IF YOU CHECKED OFF ANY PROBLEMS ON THIS QUESTIONNAIRE, HOW DIFFICULT HAVE THESE PROBLEMS MADE IT FOR YOU TO DO YOUR WORK, TAKE CARE OF THINGS AT HOME, OR GET ALONG WITH OTHER PEOPLE: SOMEWHAT DIFFICULT
7. FEELING AFRAID AS IF SOMETHING AWFUL MIGHT HAPPEN: NOT AT ALL
4. TROUBLE RELAXING: NEARLY EVERY DAY
5. BEING SO RESTLESS THAT IT IS HARD TO SIT STILL: SEVERAL DAYS
7. FEELING AFRAID AS IF SOMETHING AWFUL MIGHT HAPPEN: NOT AT ALL
GAD7 TOTAL SCORE: 11
GAD7 TOTAL SCORE: 11
3. WORRYING TOO MUCH ABOUT DIFFERENT THINGS: SEVERAL DAYS
8. IF YOU CHECKED OFF ANY PROBLEMS, HOW DIFFICULT HAVE THESE MADE IT FOR YOU TO DO YOUR WORK, TAKE CARE OF THINGS AT HOME, OR GET ALONG WITH OTHER PEOPLE?: SOMEWHAT DIFFICULT
1. FEELING NERVOUS, ANXIOUS, OR ON EDGE: NEARLY EVERY DAY
6. BECOMING EASILY ANNOYED OR IRRITABLE: SEVERAL DAYS

## 2024-07-01 ASSESSMENT — ENCOUNTER SYMPTOMS: NERVOUS/ANXIOUS: 1

## 2024-07-01 NOTE — PROGRESS NOTES
"Ishmael is a 38 year old who is being evaluated via a billable video visit.    How would you like to obtain your AVS? MyChart  If the video visit is dropped, the invitation should be resent by: Text to cell phone: [phone]  Will anyone else be joining your video visit? No      Assessment & Plan     Generalized anxiety disorder  Inadequate control.  Encourage continued efforts at healthy lifestyle habits, nonmedication measures to manage his anxiety, continued cognitive behavioral therapy.  Increase sertraline to 150 mg daily.  Follow-up with me either virtually or in person in approximately 6 weeks, notify sooner with side effects.  Refill provided of hydroxyzine to use as needed.  - hydrOXYzine jm (VISTARIL) 25 MG capsule; Take 1 capsule (25 mg) by mouth 3 times daily as needed for anxiety  - sertraline (ZOLOFT) 100 MG tablet; Take 1.5 tablets (150 mg) by mouth daily          BMI  Estimated body mass index is 37.09 kg/m  as calculated from the following:    Height as of 4/11/24: 1.791 m (5' 10.5\").    Weight as of 5/3/24: 118.9 kg (262 lb 3.2 oz).             Subjective   Ishmael is a 38 year old, presenting for the following health issues:  No chief complaint on file.    Seen today by video visit for follow-up of anxiety.  Had done well on sertraline initially 50 and later 100 mg daily, last increased in December.  Tolerating well.  Initially noting significant improvement but over the past month he has noted steady worsening of anxiety, describes as a \"low buzz of anxiety\" especially worsened over the past 2 weeks.  \"Road trip\" vacation last week with another couple, felt anxiety the entire time out of proportion to circumstances.  Sometimes near panic.  Sometimes will experience \"physical pain\" from the anxiety affecting his arms.  Working with cognitive behavioral therapist every 2 weeks.  Using meditation, breathing exercises, grounding exercises, sensory experiences, and time with his wife is " supportive and management of anxiety.  Admits that he is not exercising currently which is a change for him.  Limits caffeine to 1 to 2 cans of Mountain Dew per day, rare alcohol intake, no use of other drugs.  Denies significant depression symptoms currently.    Anxiety                     Objective           Vitals:  No vitals were obtained today due to virtual visit.    Physical Exam   GENERAL: alert and no distress  EYES: Eyes grossly normal to inspection.  No discharge or erythema, or obvious scleral/conjunctival abnormalities.  RESP: No audible wheeze, cough, or visible cyanosis.    SKIN: Visible skin clear. No significant rash, abnormal pigmentation or lesions.  NEURO: Cranial nerves grossly intact.  Mentation and speech appropriate for age.  PSYCH: Appropriate affect, tone, and pace of words          Video-Visit Details    Type of service:  Video Visit   Originating Location (pt. Location): Home    Distant Location (provider location):  On-site  Platform used for Video Visit: [facility]  Signed Electronically by: Charlotte Edgar MD    Answers submitted by the patient for this visit:  Patient Health Questionnaire (Submitted on 7/1/2024)  If you checked off any problems, how difficult have these problems made it for you to do your work, take care of things at home, or get along with other people?: Somewhat difficult  PHQ9 TOTAL SCORE: 8  AVA-7 (Submitted on 7/1/2024)  AVA 7 TOTAL SCORE: 11

## 2024-07-30 ENCOUNTER — APPOINTMENT (OUTPATIENT)
Dept: CT IMAGING | Facility: CLINIC | Age: 39
End: 2024-07-30
Attending: EMERGENCY MEDICINE
Payer: COMMERCIAL

## 2024-07-30 ENCOUNTER — HOSPITAL ENCOUNTER (EMERGENCY)
Facility: CLINIC | Age: 39
Discharge: HOME | End: 2024-07-30
Attending: EMERGENCY MEDICINE | Admitting: EMERGENCY MEDICINE
Payer: COMMERCIAL

## 2024-07-30 ENCOUNTER — NURSE TRIAGE (OUTPATIENT)
Dept: FAMILY MEDICINE | Facility: CLINIC | Age: 39
End: 2024-07-30

## 2024-07-30 VITALS
HEART RATE: 68 BPM | WEIGHT: 263 LBS | HEIGHT: 71 IN | SYSTOLIC BLOOD PRESSURE: 144 MMHG | DIASTOLIC BLOOD PRESSURE: 94 MMHG | OXYGEN SATURATION: 93 % | RESPIRATION RATE: 18 BRPM | BODY MASS INDEX: 36.82 KG/M2 | TEMPERATURE: 98 F

## 2024-07-30 DIAGNOSIS — F41.1 GENERALIZED ANXIETY DISORDER: ICD-10-CM

## 2024-07-30 DIAGNOSIS — N20.1 URETEROLITHIASIS: ICD-10-CM

## 2024-07-30 LAB
ALBUMIN UR-MCNC: NEGATIVE MG/DL
ANION GAP SERPL CALCULATED.3IONS-SCNC: 12 MMOL/L (ref 7–15)
APPEARANCE UR: CLEAR
BASOPHILS # BLD AUTO: 0.1 10E3/UL (ref 0–0.2)
BASOPHILS NFR BLD AUTO: 1 %
BILIRUB UR QL STRIP: NEGATIVE
BUN SERPL-MCNC: 12.8 MG/DL (ref 6–20)
CALCIUM SERPL-MCNC: 9.2 MG/DL (ref 8.8–10.4)
CHLORIDE SERPL-SCNC: 104 MMOL/L (ref 98–107)
COLOR UR AUTO: ABNORMAL
CREAT SERPL-MCNC: 1.07 MG/DL (ref 0.67–1.17)
EGFRCR SERPLBLD CKD-EPI 2021: >90 ML/MIN/1.73M2
EOSINOPHIL # BLD AUTO: 0 10E3/UL (ref 0–0.7)
EOSINOPHIL NFR BLD AUTO: 0 %
ERYTHROCYTE [DISTWIDTH] IN BLOOD BY AUTOMATED COUNT: 12.9 % (ref 10–15)
GLUCOSE SERPL-MCNC: 124 MG/DL (ref 70–99)
GLUCOSE UR STRIP-MCNC: NEGATIVE MG/DL
HCO3 SERPL-SCNC: 25 MMOL/L (ref 22–29)
HCT VFR BLD AUTO: 41.7 % (ref 40–53)
HGB BLD-MCNC: 13.8 G/DL (ref 13.3–17.7)
HGB UR QL STRIP: NEGATIVE
HOLD SPECIMEN: NORMAL
IMM GRANULOCYTES # BLD: 0.1 10E3/UL
IMM GRANULOCYTES NFR BLD: 1 %
KETONES UR STRIP-MCNC: NEGATIVE MG/DL
LEUKOCYTE ESTERASE UR QL STRIP: NEGATIVE
LYMPHOCYTES # BLD AUTO: 2.9 10E3/UL (ref 0.8–5.3)
LYMPHOCYTES NFR BLD AUTO: 19 %
MCH RBC QN AUTO: 29.2 PG (ref 26.5–33)
MCHC RBC AUTO-ENTMCNC: 33.1 G/DL (ref 31.5–36.5)
MCV RBC AUTO: 88 FL (ref 78–100)
MONOCYTES # BLD AUTO: 0.7 10E3/UL (ref 0–1.3)
MONOCYTES NFR BLD AUTO: 4 %
NEUTROPHILS # BLD AUTO: 11.6 10E3/UL (ref 1.6–8.3)
NEUTROPHILS NFR BLD AUTO: 76 %
NITRATE UR QL: NEGATIVE
NRBC # BLD AUTO: 0 10E3/UL
NRBC BLD AUTO-RTO: 0 /100
PH UR STRIP: 7.5 [PH] (ref 5–7)
PLATELET # BLD AUTO: 349 10E3/UL (ref 150–450)
POTASSIUM SERPL-SCNC: 4.1 MMOL/L (ref 3.4–5.3)
RBC # BLD AUTO: 4.73 10E6/UL (ref 4.4–5.9)
RBC URINE: 0 /HPF
SODIUM SERPL-SCNC: 141 MMOL/L (ref 135–145)
SP GR UR STRIP: 1.01 (ref 1–1.03)
UROBILINOGEN UR STRIP-MCNC: <2 MG/DL
WBC # BLD AUTO: 15.4 10E3/UL (ref 4–11)
WBC URINE: 0 /HPF

## 2024-07-30 PROCEDURE — 258N000003 HC RX IP 258 OP 636: Performed by: EMERGENCY MEDICINE

## 2024-07-30 PROCEDURE — 74176 CT ABD & PELVIS W/O CONTRAST: CPT

## 2024-07-30 PROCEDURE — 99285 EMERGENCY DEPT VISIT HI MDM: CPT | Mod: 25

## 2024-07-30 PROCEDURE — 250N000011 HC RX IP 250 OP 636: Performed by: EMERGENCY MEDICINE

## 2024-07-30 PROCEDURE — 96361 HYDRATE IV INFUSION ADD-ON: CPT

## 2024-07-30 PROCEDURE — 96374 THER/PROPH/DIAG INJ IV PUSH: CPT | Mod: 59

## 2024-07-30 PROCEDURE — 250N000013 HC RX MED GY IP 250 OP 250 PS 637: Performed by: EMERGENCY MEDICINE

## 2024-07-30 PROCEDURE — 96375 TX/PRO/DX INJ NEW DRUG ADDON: CPT

## 2024-07-30 PROCEDURE — 80048 BASIC METABOLIC PNL TOTAL CA: CPT | Performed by: EMERGENCY MEDICINE

## 2024-07-30 PROCEDURE — 81001 URINALYSIS AUTO W/SCOPE: CPT | Performed by: EMERGENCY MEDICINE

## 2024-07-30 PROCEDURE — 85041 AUTOMATED RBC COUNT: CPT | Performed by: EMERGENCY MEDICINE

## 2024-07-30 PROCEDURE — 36415 COLL VENOUS BLD VENIPUNCTURE: CPT | Performed by: EMERGENCY MEDICINE

## 2024-07-30 RX ORDER — DIMENHYDRINATE 50 MG
50 TABLET ORAL AT BEDTIME
Qty: 7 TABLET | Refills: 0 | Status: SHIPPED | OUTPATIENT
Start: 2024-07-30 | End: 2024-08-06

## 2024-07-30 RX ORDER — IBUPROFEN 200 MG
400 TABLET ORAL EVERY 6 HOURS
Qty: 56 TABLET | Refills: 0 | Status: SHIPPED | OUTPATIENT
Start: 2024-07-30 | End: 2024-08-06

## 2024-07-30 RX ORDER — DIMENHYDRINATE 50 MG
50 TABLET ORAL EVERY 6 HOURS PRN
Qty: 28 TABLET | Refills: 0 | Status: SHIPPED | OUTPATIENT
Start: 2024-07-30 | End: 2024-08-06

## 2024-07-30 RX ORDER — SERTRALINE HYDROCHLORIDE 100 MG/1
200 TABLET, FILM COATED ORAL DAILY
Qty: 180 TABLET | Refills: 3 | Status: SHIPPED | OUTPATIENT
Start: 2024-07-30

## 2024-07-30 RX ORDER — ACETAMINOPHEN 500 MG
1000 TABLET ORAL EVERY 6 HOURS
Qty: 56 TABLET | Refills: 0 | Status: SHIPPED | OUTPATIENT
Start: 2024-07-30 | End: 2024-08-06

## 2024-07-30 RX ORDER — ONDANSETRON 2 MG/ML
4 INJECTION INTRAMUSCULAR; INTRAVENOUS ONCE
Status: COMPLETED | OUTPATIENT
Start: 2024-07-30 | End: 2024-07-30

## 2024-07-30 RX ORDER — KETOROLAC TROMETHAMINE 15 MG/ML
15 INJECTION, SOLUTION INTRAMUSCULAR; INTRAVENOUS ONCE
Status: COMPLETED | OUTPATIENT
Start: 2024-07-30 | End: 2024-07-30

## 2024-07-30 RX ORDER — ACETAMINOPHEN 325 MG/1
650 TABLET ORAL ONCE
Status: COMPLETED | OUTPATIENT
Start: 2024-07-30 | End: 2024-07-30

## 2024-07-30 RX ADMIN — ACETAMINOPHEN 650 MG: 325 TABLET ORAL at 09:01

## 2024-07-30 RX ADMIN — Medication 50 MG: at 09:01

## 2024-07-30 RX ADMIN — ONDANSETRON 4 MG: 2 INJECTION INTRAMUSCULAR; INTRAVENOUS at 09:01

## 2024-07-30 RX ADMIN — SODIUM CHLORIDE 1000 ML: 9 INJECTION, SOLUTION INTRAVENOUS at 09:04

## 2024-07-30 RX ADMIN — KETOROLAC TROMETHAMINE 15 MG: 15 INJECTION, SOLUTION INTRAMUSCULAR; INTRAVENOUS at 09:01

## 2024-07-30 ASSESSMENT — COLUMBIA-SUICIDE SEVERITY RATING SCALE - C-SSRS
2. HAVE YOU ACTUALLY HAD ANY THOUGHTS OF KILLING YOURSELF IN THE PAST MONTH?: NO
6. HAVE YOU EVER DONE ANYTHING, STARTED TO DO ANYTHING, OR PREPARED TO DO ANYTHING TO END YOUR LIFE?: NO
1. IN THE PAST MONTH, HAVE YOU WISHED YOU WERE DEAD OR WISHED YOU COULD GO TO SLEEP AND NOT WAKE UP?: NO

## 2024-07-30 NOTE — Clinical Note
Ishmael Petersen was seen and treated in our emergency department on 7/30/2024.  He may return to work on 07/31/2024.       If you have any questions or concerns, please don't hesitate to call.      Yaima Acosta MD

## 2024-07-30 NOTE — ED TRIAGE NOTES
Pt c/o flank pain starting at 1am making it so he couldn't sleep. Took 2 aleve but then vomited. Pain subsided for 30 min but then came back at 6am. Pain now improved but still present. During peak of pain was having bilateral testicle pain. No dysuria. No fevers.      Triage Assessment (Adult)       Row Name 07/30/24 0752          Triage Assessment    Airway WDL WDL        Respiratory WDL    Respiratory WDL WDL        Skin Circulation/Temperature WDL    Skin Circulation/Temperature WDL WDL        Cardiac WDL    Cardiac WDL WDL        Peripheral/Neurovascular WDL    Peripheral Neurovascular WDL WDL        Cognitive/Neuro/Behavioral WDL    Cognitive/Neuro/Behavioral WDL WDL

## 2024-07-30 NOTE — ED PROVIDER NOTES
EMERGENCY DEPARTMENT ENCOUNTER      NAME: Ishmael Petersen  AGE: 38 year old male  YOB: 1985  MRN: [id number]  EVALUATION DATE & TIME: No admission date for patient encounter.    PCP: Charlotte Edgar    ED PROVIDER: Yaima Acosta M.D.      Chief Complaint   Patient presents with    Flank Pain         FINAL IMPRESSION:  1. Ureterolithiasis          ED COURSE & MEDICAL DECISION MAKING:    ED Course as of 07/30/24 1006   Tue Jul 30, 2024   0841 UA without cells to suggest UTI and chemistry and CBC WNL except for WBC slightly elevated at 15 with acute left flank pain radiaitng towards LLQ without scrotal involvement and negative ROS otherwise, no urinary complaints or personal or family h/o kidney stones. VS WNL and no reproducible pain on examination reassuringly, pending CT to screen for ureteral stone.    0920 Pt with CT showing passing left ureteral stone going into bladder, which does explain symtpoms and additionally confirms diagnosis. Given spontaneous passage of stone into bladder, will reassess patient shortly as pain is likely resolved or resolving at this time. UA wihtout UTI thus no superimposed UTI for stenting consideration     8:19 AM I met with the patient for initial interview and encounter. We discussed a plan for treatment and diagnostic interventions.      Pertinent Labs & Imaging studies reviewed. (See chart for details)      At the conclusion of the encounter I discussed the results of all of the tests and the disposition. The questions were answered. The patient or family acknowledged understanding and was agreeable with the care plan.     MEDICATIONS GIVEN IN THE EMERGENCY:  Medications   acetaminophen (TYLENOL) tablet 650 mg (650 mg Oral $Given 7/30/24 0901)   ketorolac (TORADOL) injection 15 mg (15 mg Intravenous $Given 7/30/24 0901)   dimenhyDRINATE chew tab 50 mg (50 mg Oral $Given 7/30/24 0901)   ondansetron (ZOFRAN) injection 4 mg (4 mg Intravenous $Given 7/30/24  "0901)   sodium chloride 0.9% BOLUS 1,000 mL (1,000 mLs Intravenous $New Bag 7/30/24 0904)       NEW PRESCRIPTIONS STARTED AT TODAY'S ER VISIT  New Prescriptions    ACETAMINOPHEN (TYLENOL) 500 MG TABLET    Take 2 tablets (1,000 mg) by mouth every 6 hours for 7 days    DIMENHYDRINATE (DRAMAMINE) 50 MG TABLET    Take 1 tablet (50 mg) by mouth at bedtime for 7 days    DIMENHYDRINATE (DRAMAMINE) 50 MG TABLET    Take 1 tablet (50 mg) by mouth every 6 hours as needed for other (kidney stone pain management)    IBUPROFEN (ADVIL/MOTRIN) 200 MG TABLET    Take 2 tablets (400 mg) by mouth every 6 hours for 7 days          =================================================================    HPI      Ishmael Petersen is a 38 year old male with PMHx of morbid obesity and hyperlipemia who presents to the ED today via private car with flank pain.     The patient reports an onset of sharp left flank pain last night. States he has been tossing and turning all night with night sweats due to the pain. Describes the pain as a \"tearing sensation\" as it radiated to his low back and bladder until it improved around 6:00 AM, and then returned shortly afterwards. He took 2 tablets of Aleve but vomited afterwards around 4:00 AM. The pain is currently 3-4/10. No fall or trauma prior to the pain.     Unknown family history of kidney stones. No known allergies to medications.     He denies fever, diarrhea, dysuria, hematuria, and any other complaints at this time.       REVIEW OF SYSTEMS   All other systems reviewed and are negative except as noted above in HPI.    PAST MEDICAL HISTORY:  Past Medical History:   Diagnosis Date    Arthritis     Depression     Hypertension     Sleep apnea        PAST SURGICAL HISTORY:  Past Surgical History:   Procedure Laterality Date    ARTHROSCOPY KNEE Left     VASECTOMY         CURRENT MEDICATIONS:    acetaminophen (TYLENOL) 500 MG tablet  dimenhyDRINATE (DRAMAMINE) 50 MG tablet  dimenhyDRINATE " (DRAMAMINE) 50 MG tablet  ibuprofen (ADVIL/MOTRIN) 200 MG tablet  albuterol (PROAIR HFA/PROVENTIL HFA/VENTOLIN HFA) 108 (90 Base) MCG/ACT inhaler  azelastine (ASTELIN) 0.1 % nasal spray  cetirizine 10 mg TbDL  cyclobenzaprine (FLEXERIL) 10 MG tablet  hydrOXYzine jm (VISTARIL) 25 MG capsule  ketoconazole (NIZORAL) 2 % external shampoo  omeprazole (PRILOSEC) 20 MG DR capsule  sertraline (ZOLOFT) 100 MG tablet  SUMAtriptan (IMITREX) 25 MG tablet        ALLERGIES:  Allergies   Allergen Reactions    Aspartame Headache     Causes migraines.       FAMILY HISTORY:  Family History   Problem Relation Age of Onset    Hypertension Mother     Sleep Apnea Father     Diabetes Brother         type 1 diabetes    Diabetes Paternal Grandmother     Seizure Disorder Daughter        SOCIAL HISTORY:   Social History     Socioeconomic History    Marital status:    Tobacco Use    Smoking status: Never     Passive exposure: Never    Smokeless tobacco: Never   Vaping Use    Vaping status: Never Used   Substance and Sexual Activity    Alcohol use: Yes     Comment: one/month    Drug use: Never    Sexual activity: Yes     Partners: Female     Birth control/protection: Male Surgical     Comment:    Other Topics Concern    Parent/sibling w/ CABG, MI or angioplasty before 65F 55M? No     Social Determinants of Health     Financial Resource Strain: Low Risk  (7/1/2024)    Financial Resource Strain     Within the past 12 months, have you or your family members you live with been unable to get utilities (heat, electricity) when it was really needed?: No   Food Insecurity: Low Risk  (7/1/2024)    Food Insecurity     Within the past 12 months, did you worry that your food would run out before you got money to buy more?: No     Within the past 12 months, did the food you bought just not last and you didn t have money to get more?: No   Transportation Needs: Low Risk  (7/1/2024)    Transportation Needs     Within the past 12 months, has  "lack of transportation kept you from medical appointments, getting your medicines, non-medical meetings or appointments, work, or from getting things that you need?: No   Housing Stability: Low Risk  (7/1/2024)    Housing Stability     Do you have housing? : Yes     Are you worried about losing your housing?: No       VITALS:  Patient Vitals for the past 24 hrs:   BP Temp Pulse Resp SpO2 Height Weight   07/30/24 0903 (!) 152/84 -- 71 16 94 % -- --   07/30/24 0750 (!) 148/84 98  F (36.7  C) 73 18 94 % 1.791 m (5' 10.5\") 119.3 kg (263 lb)       PHYSICAL EXAM    GENERAL: Awake, alert.  In no acute distress.   HEENT: Normocephalic, atraumatic.  Pupils equal, round and reactive.  Conjunctiva normal.  EOMI.  NECK: No stridor or apparent deformity.  PULMONARY: Symmetrical breath sounds without distress.  Lungs clear to auscultation bilaterally without wheezes, rhonchi or rales.  CARDIO: Regular rate and rhythm.  No significant murmur, rub or gallop.  Radial pulses strong and symmetrical.  ABDOMINAL: Abdomen soft, non-distended and non-tender to palpation.  No CVAT, no palpable hepatosplenomegaly. Left flank identified site of pain but not worse with palpation.   EXTREMITIES: No lower extremity swelling or edema.    NEURO: Alert and oriented to person, place and time.  Cranial nerves grossly intact.  No focal motor deficit.  PSYCH: Normal mood and affect  SKIN: No rashes      LAB:  All pertinent labs reviewed and interpreted.  Results for orders placed or performed during the hospital encounter of 07/30/24   CT Abdomen Pelvis w/o Contrast    Impression    IMPRESSION:     1.  A 2 mm stone projects just beyond the left ureterovesicular junction into the urinary bladder (either almost passed or recently passed into the bladder). Minimal left hydronephrosis.    2.  Nonobstructing 2 mm left renal stone. A few other subtle tiny renal papillary tip hyperdensities, though no other well-formed stones.    3.  Mild hepatic " steatosis.    4.  No free fluid or air.     UA with Microscopic reflex to Culture    Specimen: Urine, Clean Catch   Result Value Ref Range    Color Urine Light Yellow Colorless, Straw, Light Yellow, Yellow    Appearance Urine Clear Clear    Glucose Urine Negative Negative mg/dL    Bilirubin Urine Negative Negative    Ketones Urine Negative Negative mg/dL    Specific Gravity Urine 1.008 1.001 - 1.030    Blood Urine Negative Negative    pH Urine 7.5 (H) 5.0 - 7.0    Protein Albumin Urine Negative Negative mg/dL    Urobilinogen Urine <2.0 <2.0 mg/dL    Nitrite Urine Negative Negative    Leukocyte Esterase Urine Negative Negative    RBC Urine 0 <=2 /HPF    WBC Urine 0 <=5 /HPF   Extra Blue Top Tube   Result Value Ref Range    Hold Specimen JIC    Extra Green Top (Lithium Heparin) Tube   Result Value Ref Range    Hold Specimen JIC    Extra Purple Top Tube   Result Value Ref Range    Hold Specimen JIC    Basic metabolic panel   Result Value Ref Range    Sodium 141 135 - 145 mmol/L    Potassium 4.1 3.4 - 5.3 mmol/L    Chloride 104 98 - 107 mmol/L    Carbon Dioxide (CO2) 25 22 - 29 mmol/L    Anion Gap 12 7 - 15 mmol/L    Urea Nitrogen 12.8 6.0 - 20.0 mg/dL    Creatinine 1.07 0.67 - 1.17 mg/dL    GFR Estimate >90 >60 mL/min/1.73m2    Calcium 9.2 8.8 - 10.4 mg/dL    Glucose 124 (H) 70 - 99 mg/dL   CBC with platelets and differential   Result Value Ref Range    WBC Count 15.4 (H) 4.0 - 11.0 10e3/uL    RBC Count 4.73 4.40 - 5.90 10e6/uL    Hemoglobin 13.8 13.3 - 17.7 g/dL    Hematocrit 41.7 40.0 - 53.0 %    MCV 88 78 - 100 fL    MCH 29.2 26.5 - 33.0 pg    MCHC 33.1 31.5 - 36.5 g/dL    RDW 12.9 10.0 - 15.0 %    Platelet Count 349 150 - 450 10e3/uL    % Neutrophils 76 %    % Lymphocytes 19 %    % Monocytes 4 %    % Eosinophils 0 %    % Basophils 1 %    % Immature Granulocytes 1 %    NRBCs per 100 WBC 0 <1 /100    Absolute Neutrophils 11.6 (H) 1.6 - 8.3 10e3/uL    Absolute Lymphocytes 2.9 0.8 - 5.3 10e3/uL    Absolute Monocytes  0.7 0.0 - 1.3 10e3/uL    Absolute Eosinophils 0.0 0.0 - 0.7 10e3/uL    Absolute Basophils 0.1 0.0 - 0.2 10e3/uL    Absolute Immature Granulocytes 0.1 <=0.4 10e3/uL    Absolute NRBCs 0.0 10e3/uL       RADIOLOGY:  Reviewed all pertinent imaging. Please see official radiology report.  CT Abdomen Pelvis w/o Contrast   Final Result   IMPRESSION:       1.  A 2 mm stone projects just beyond the left ureterovesicular junction into the urinary bladder (either almost passed or recently passed into the bladder). Minimal left hydronephrosis.      2.  Nonobstructing 2 mm left renal stone. A few other subtle tiny renal papillary tip hyperdensities, though no other well-formed stones.      3.  Mild hepatic steatosis.      4.  No free fluid or air.                 I, Joey Arcos, am serving as a scribe to document services personally performed by Dr. Yaima Acosta based on my observation and the provider's statements to me. I, Yaima Acosta MD attest that Joey Arcos is acting in a scribe capacity, has observed my performance of the services and has documented them in accordance with my direction.       Yaima Acosta MD  07/30/24 6194       Yaima Acosta MD  07/30/24 1334

## 2024-07-30 NOTE — TELEPHONE ENCOUNTER
Nurse Triage SBAR    Is this a 2nd Level Triage? YES    Situation: Severe pain during the night from left side radiating to groin/bladder.  Has not urinated since 2:30 am.    Background: History of obesity and allergies    Assessment: Patient does not think he has a fever, but the pain has been intermittent throughout the night.  No blood in urine.  Has not had kidney stones before but is suspecting this is.     Protocol Recommended Disposition:   Go To ED/UCC Now (Or To Office With PCP Approval)    Recommendation: Per protocol advised to go to ED or UC today.  Patient states his wife can drive him there.     RU Farooq RN  [facility]           Does the patient meet one of the following criteria for ADS visit consideration? 16+ years old, with an [facility] PCP     TIP  Providers, please consider if this condition is appropriate for management at one of our Acute and Diagnostic Services sites.     If patient is a good candidate, please use dotphrase <dot>triageresponse and select Refer to ADS to document.  Reason for Disposition   Pain radiates into groin, scrotum    Additional Information   Negative: Passed out (i.e., fainted, collapsed and was not responding)   Negative: Shock suspected (e.g., cold/pale/clammy skin, too weak to stand, low BP, rapid pulse)   Negative: Sounds like a life-threatening emergency to the triager   Negative: Major injury to the back (e.g., MVA, fall > 10 feet or 3 meters, penetrating injury, etc.)   Negative: Pain in the upper back over the ribs (rib cage) that radiates (travels) into the chest   Negative: Pain in the upper back over the ribs (rib cage) and worsened by coughing (or clearly increases with breathing)   Negative: Back pain during pregnancy   Negative: SEVERE back pain of sudden onset and age > 60 years   Negative: SEVERE abdominal pain (e.g., excruciating)   Negative: Abdominal pain and age > 60 years   Negative: Unable to urinate (or only a few  "drops) and bladder feels very full   Negative: Loss of bladder or bowel control (urine or bowel incontinence; wetting self, leaking stool) of new-onset   Negative: Numbness (loss of sensation) in groin or rectal area    Answer Assessment - Initial Assessment Questions  1. ONSET: \"When did the pain begin?\"       Started about 1 am  2. LOCATION: \"Where does it hurt?\" (upper, mid or lower back)        Started between hip bone and rib cage on lower left side of back and traveled from there into pelvic region and what feels like to my bladder.    3. SEVERITY: \"How bad is the pain?\"  (e.g., Scale 1-10; mild, moderate, or severe)    - MILD (1-3): Doesn't interfere with normal activities.     - MODERATE (4-7): Interferes with normal activities or awakens from sleep.     - SEVERE (8-10): Excruciating pain, unable to do any normal activities.       7-10  4. PATTERN: \"Is the pain constant?\" (e.g., yes, no; constant, intermittent)       Varies  5. RADIATION: \"Does the pain shoot into your legs or somewhere else?\"      Yes into pelvis and bladder and pelvis reagion  6. CAUSE:  \"What do you think is causing the back pain?\"       Kidney stone  7. BACK OVERUSE:  \"Any recent lifting of heavy objects, strenuous work or exercise?\"      no  8. MEDICINES: \"What have you taken so far for the pain?\" (e.g., nothing, acetaminophen, NSAIDS)      Waves of soreness  9. NEUROLOGIC SYMPTOMS: \"Do you have any weakness, numbness, or problems with bowel/bladder control?\"      No leaking or incontinence  10. OTHER SYMPTOMS: \"Do you have any other symptoms?\" (e.g., fever, abdomen pain, burning with urination, blood in urine)        No fever does not think.  Have not been able to pee since 2:30  11. PREGNANCY: \"Is there any chance you are pregnant?\" \"When was your last menstrual period?\"        N/a    Protocols used: Back Pain-A-[place]    "

## 2024-07-31 ENCOUNTER — TELEPHONE (OUTPATIENT)
Dept: UROLOGY | Facility: CLINIC | Age: 39
End: 2024-07-31

## 2024-07-31 ENCOUNTER — LAB (OUTPATIENT)
Dept: LAB | Facility: CLINIC | Age: 39
End: 2024-07-31
Payer: COMMERCIAL

## 2024-07-31 DIAGNOSIS — N20.1 CALCULUS OF URETER: ICD-10-CM

## 2024-07-31 DIAGNOSIS — E78.2 MIXED HYPERLIPIDEMIA: Primary | ICD-10-CM

## 2024-07-31 DIAGNOSIS — N20.1 CALCULUS OF URETER: Primary | ICD-10-CM

## 2024-07-31 PROCEDURE — 99000 SPECIMEN HANDLING OFFICE-LAB: CPT

## 2024-07-31 PROCEDURE — 82365 CALCULUS SPECTROSCOPY: CPT | Mod: 90

## 2024-07-31 NOTE — TELEPHONE ENCOUNTER
Spoke with patient who will drop off stone today at [facility] lab.  Order in, vv already set up.  Evi Montaño RN

## 2024-07-31 NOTE — TELEPHONE ENCOUNTER
[facility] Call Center    Phone Message    May a detailed message be left on voicemail: yes     Reason for Call: Other: pt has passed stone and now needs to know where he needs to bring it, please call pt     Action Taken: Other: urology    Travel Screening: Not Applicable     Date of Service:

## 2024-08-01 ENCOUNTER — PATIENT OUTREACH (OUTPATIENT)
Dept: FAMILY MEDICINE | Facility: CLINIC | Age: 39
End: 2024-08-01
Payer: COMMERCIAL

## 2024-08-01 NOTE — TELEPHONE ENCOUNTER
Transitions of Care Outreach  Chief Complaint   Patient presents with    Hospital F/U       Most Recent Admission Date: 7/30/2024   Most Recent Admission Diagnosis:      Most Recent Discharge Date: 7/30/2024   Most Recent Discharge Diagnosis: Ureterolithiasis - N20.1     Transitions of Care Assessment    Discharge Assessment  How are you doing now that you are home?: overall doing well, a little bit sore  How are your symptoms? (Red Flag symptoms escalate to triage hotline per guidelines): Improved  Do you know how to contact your clinic care team if you have future questions or changes to your health status? : Yes  Does the patient have their discharge instructions? : Yes  Does the patient have questions regarding their discharge instructions? : No  Were you started on any new medications or were there changes to any of your previous medications? : Yes  Does the patient have all of their medications?: Yes  Do you have questions regarding any of your medications? : No  Do you have all of your needed medical supplies or equipment (DME)?  (i.e. oxygen tank, CPAP, cane, etc.): Yes    Follow up Plan     Discharge Follow-Up  Discharge follow up appointment scheduled in alignment with recommended follow up timeframe or Transitions of Risk Category? (Low = within 30 days; Moderate= within 14 days; High= within 7 days): Yes  Discharge Follow Up Appointment Date: 08/14/24  Discharge Follow Up Appointment Scheduled with?: Specialty Care Provider    Future Appointments   Date Time Provider Department Center   8/14/2024  8:00 AM Fadia Cardenas APRN CNP [facility]       Outpatient Plan as outlined on AVS reviewed with patient.    For any urgent concerns, please contact our 24 hour nurse triage line: [phone] ([phone])       Deloris Fuller RN

## 2024-08-04 LAB
APPEARANCE STONE: NORMAL
COMPN STONE: NORMAL
SPECIMEN WT: 8 MG

## 2024-08-14 ENCOUNTER — VIRTUAL VISIT (OUTPATIENT)
Dept: UROLOGY | Facility: CLINIC | Age: 39
End: 2024-08-14
Attending: EMERGENCY MEDICINE
Payer: COMMERCIAL

## 2024-08-14 ENCOUNTER — TELEPHONE (OUTPATIENT)
Dept: UROLOGY | Facility: CLINIC | Age: 39
End: 2024-08-14

## 2024-08-14 DIAGNOSIS — N20.0 CALCIUM OXALATE KIDNEY STONES: ICD-10-CM

## 2024-08-14 DIAGNOSIS — N20.0 NEPHROLITHIASIS: Primary | ICD-10-CM

## 2024-08-14 PROCEDURE — 99203 OFFICE O/P NEW LOW 30 MIN: CPT | Mod: 95 | Performed by: NURSE PRACTITIONER

## 2024-08-14 ASSESSMENT — PAIN SCALES - GENERAL: PAINLEVEL: NO PAIN (0)

## 2024-08-14 NOTE — PATIENT INSTRUCTIONS
"UROLOGY CLINIC VISIT PATIENT INSTRUCTIONS    -If having severe flank pain, fevers, chills, nausea, or vomiting please notify Urology clinic or be seen in the ER.       If you have any issues, questions or concerns in the meantime, do not hesitate to contact us at [facility] at [phone] or via [employer].     Fadia Cardenas CNP  Department of Urology       DIET & LIFESTYLE CHANGES FOR PATIENTS WITH KIDNEY STONES    If you've had a kidney stone, you are likely to form another. Risk of recurrence is 15% at 1 year, 35% to 40% at 2 years, and 50% at 10 years. These recommendations have shown to be effective.    CALCIUM STONES (Oxalate and Phosphate)    Fluid intake is the most important prevention measure to help prevent stones. Fluid intake should be at least 2.5 liters per day or 90-120oz per day. With goal of urine output of >2.5L per day.   Increasing liquids that have citric acid may help such as low calorie orange juice, lemonade (Crystal Light Lemonade or True Lemon/Lime), or adding a citrus to your water.  You can add lemon juice or fresh elsi to your water daily.  Lemon juice increases the citrate in your urine, and helps decrease the formation of stone and even breakdown certain types of stones. Add a cap full/teaspoon of pure lemon juice to each glass.   Try to limit sugar, especially if you have diabetes.    Helpful Fluid Measurements:  1 liter = 34oz  1 quart = 32 oz  24 pack water: Each bottle 16.9 oz     Low Oxalate Diet: Limit your consumption of OXALATE-rich foods including:  All nuts and nut products including peanuts, almonds,peanut butter, almond milk  Spinach  Rhubarb  Beets  Chocolate  Soybeans and soy products   Wheat Germ    Website:   [email or web address] and Ani.com  <50mg   Below is a link to a PDF that is based on [employer] research. Please stick to pages 6-9 of this document. My suggestion is to review the list of food that is OK. The \"avoid\" list can be " leatha.  [email or web address]_[email or web address]_[email or web address]        Low Sodium Diet: Salt (sodium chloride) is found in abundance in many foods. High sodium levels in the urine can interfere with the kidney's handling of calcium.   Trying a DASH (Dietary Approaches to Stop Hypertension) diet which is eating more fruits and vegetables, limiting salt intake, moderate in low-fat diary products, and low in animal protein.   Try to decrease salt intake to <2000 mg of sodium daily.     Tips for reducing the salt in your diet:  Don't use salt at the table  Reduce the salt used in food preparation. Try 1/2 teaspoon when recipes call for 1 teaspoon.  Use herbs and spices for flavoring instead of salt.  Avoid salty foods.  Check the label before you buy or use a product. Note sodium and portion size information.  Try to consume less than 2,000 mg/day. (1 teaspoon = 2,000 mg)    Foods with high sodium content include:  Processed meat (including luncheon meats, sausage)   Crackers   Instant cereal   Processed cheese   Canned soups   Chips and snack foods   Soy sauce    Low Animal Protein: Reduce animal protein (meat) intake to no more than 8-10 ounces per day.     Maintain a normal calcium diet: Calcium rich foods are encouraged, but no more than 1000 - 1200 mg per day. Researches have found that people with low calcium intakes tend to have more stones. Foods with high calcium content are acceptable and include:  Calcium rich foods include:   Diary (cheese, milk, and yogurt)  Enriched cereals  Meat and fish  Dark green vegetables  Non-Dairy Calcium Sources:  Fortified Coconut, Rice, Flax, Oat milk  (avoid almond milk)  Calcium fortified Orange Juice  - look for a low sugar/light variety  Canned sardines, canned pink salmon with bones  - look for low sodium options  Fortified cereals  Oatmeal  Broccoli, peas,  chinese cabbage/bok dong, Kale, mustard greens, pistachio nuts, mung beans, red kidney beans     Limit Vitamin C intake to < 1000 mg daily.    Increase fruit and vegetables to 5 servings per day.    Consultation with a dietician may be helpful as well.  Please let our staff know if you are interested in this helpful option so a consult may be placed for you.

## 2024-08-14 NOTE — NURSING NOTE
Current patient location: [address] [place] [id number]    Is the patient currently in the state of [place]? YES    Visit mode:VIDEO    If the visit is dropped, the patient can be reconnected by: VIDEO VISIT: Text to cell phone:   Telephone Information:   Mobile [phone]       Will anyone else be joining the visit? NO  (If patient encounters technical issues they should call [phone]    How would you like to obtain your AVS? MyChart    Are changes needed to the allergy or medication list? No    Are refills needed on medications prescribed by this physician? NO    Rooming Documentation:  Assigned questionnaire(s) completed      Reason for visit: Consult (NEW KIDNEY STONE)    Mary Ann VELOZF

## 2024-08-14 NOTE — PROGRESS NOTES
Urology Video Office Visit    Video-Visit Details    Type of service:  Video Visit    Video Start Time: 0759    Video End Time: 0814    Originating Location (pt. Location): Home    Distant Location (provider location):  On-site     Platform used for Video Visit: [employer]           Assessment and Plan:     Assessment: 38 year old male with CaOx stones    Plan:  -Reviewed CT scan with patient. Noted one punctate nonobstructing left renal stone.   -Reviewed stone analysis with patient. The patient and I discussed the diagnosis and natural history of urolithiasis. Discussed option of 24 hour urine study for further evaluation for risk of stones. Pt amenable to plan of care. Will send litholink kit x 1.   -We discussed some general measures to prevent recurrent kidney stones.  These include fluid intake to achieve 2.5 liters of urine per day, decreased salt intake, normal calcium intake, lowering animal protein intake, avoiding high amounts of oxalate containing foods, and increased citrate intake with orange juice/lemonade.   -RTC in 6-8 weeks to review 24 hour urine study.     Fadia Cardenas CNP  Department of Urology  August 14, 2024    I spent a total of 25 minutes spent on the date of the encounter doing chart review, history and exam, documentation, and further activities as noted above.          Chief Complaint:   Nephrolithiasis         History of Present Illness:    Ishmael Petersen is a pleasant 38 year old male who presents with concerns of a left ureteral stone.     Mr. Petersen was seen in the ER on 7/30/24 for concerns of left flank pain.     CT scan on 7/30/24 (images personally reviewed) revealed a left 2mm UVJ stone and a left punctate nonobstructing stone. No noted right nephrolithiasis or hydronephrosis.     He was able to pass and retrieve his stone later that evening on 7/30/24.     Stone Analysis: CaOx    He is doing well since passage of his stone. Notes intermittent left tension. Denies  any flank pain, f/c/n/v, gross hematuria, or dysuria.     This was his first stone episode. Unknown for family history of stones.     Stone Risk Factors: None    Does drink about 2-3 L of fluids per day.          Past Medical History:     Past Medical History:   Diagnosis Date    Arthritis     Depression     Hypertension     Sleep apnea             Past Surgical History:     Past Surgical History:   Procedure Laterality Date    ARTHROSCOPY KNEE Left     VASECTOMY              Medications     Current Outpatient Medications   Medication Sig Dispense Refill    albuterol (PROAIR HFA/PROVENTIL HFA/VENTOLIN HFA) 108 (90 Base) MCG/ACT inhaler Inhale 2 puffs into the lungs every 6 hours as needed for shortness of breath, wheezing or cough 18 g 1    azelastine (ASTELIN) 0.1 % nasal spray Spray 2 sprays into both nostrils 2 times daily 30 mL 3    cetirizine 10 mg TbDL [CETIRIZINE 10 MG TBDL] Take by mouth.      cyclobenzaprine (FLEXERIL) 10 MG tablet TAKE 1 TABLET BY MOUTH 3 TIMES DAILY AS NEEDED FOR MUSCLE SPASMS 30 tablet 0    hydrOXYzine jm (VISTARIL) 25 MG capsule Take 1 capsule (25 mg) by mouth 3 times daily as needed for anxiety 20 capsule 1    ketoconazole (NIZORAL) 2 % external shampoo [KETOCONAZOLE (NIZORAL) 2 % SHAMPOO] APPLY TO DAMP SKIN, LATHER, LEAVE ON 5 MINUTES, AND RINSE 120 mL 3    omeprazole (PRILOSEC) 20 MG DR capsule TAKE 1 CAPSULE BY MOUTH EVERY DAY 90 capsule 1    sertraline (ZOLOFT) 100 MG tablet Take 2 tablets (200 mg) by mouth daily 180 tablet 3    SUMAtriptan (IMITREX) 25 MG tablet Take 1 tablet (25 mg) by mouth every 2 hours as needed for migraine 10 tablet 3     No current facility-administered medications for this visit.            Family History:     Family History   Problem Relation Age of Onset    Hypertension Mother     Sleep Apnea Father     Diabetes Brother         type 1 diabetes    Diabetes Paternal Grandmother     Seizure Disorder Daughter             Social History:     Social History      Socioeconomic History    Marital status:      Spouse name: Not on file    Number of children: Not on file    Years of education: Not on file    Highest education level: Not on file   Occupational History    Not on file   Tobacco Use    Smoking status: Never     Passive exposure: Never    Smokeless tobacco: Never   Vaping Use    Vaping status: Never Used   Substance and Sexual Activity    Alcohol use: Yes     Comment: one/month    Drug use: Never    Sexual activity: Yes     Partners: Female     Birth control/protection: Male Surgical     Comment:    Other Topics Concern    Parent/sibling w/ CABG, MI or angioplasty before 65F 55M? No   Social History Narrative    Not on file     Social Determinants of Health     Financial Resource Strain: Low Risk  (7/1/2024)    Financial Resource Strain     Within the past 12 months, have you or your family members you live with been unable to get utilities (heat, electricity) when it was really needed?: No   Food Insecurity: Low Risk  (7/1/2024)    Food Insecurity     Within the past 12 months, did you worry that your food would run out before you got money to buy more?: No     Within the past 12 months, did the food you bought just not last and you didn t have money to get more?: No   Transportation Needs: Low Risk  (7/1/2024)    Transportation Needs     Within the past 12 months, has lack of transportation kept you from medical appointments, getting your medicines, non-medical meetings or appointments, work, or from getting things that you need?: No   Physical Activity: Not on file   Stress: Not on file   Social Connections: Not on file   Interpersonal Safety: Not on file   Housing Stability: Low Risk  (7/1/2024)    Housing Stability     Do you have housing? : Yes     Are you worried about losing your housing?: No            Allergies:   Aspartame         Review of Systems:  From intake questionnaire   Negative 14 system review except as noted on HPI, nurse's  note.         Physical Exam:   General Appearance: Well groomed, hygenic  Eyes: No redness, discharge  Respiratory: No cough, no respiratory distress or labored breathing  Musculoskeletal: Grossly normal, full range of motion in upper extremities, no gross deficits  Skin: No discoloration or apparent rashes  Neurologic - No tremors  Psychiatric - Alert and oriented  The rest of a comprehensive physical examination is deferred due to video visit restrictions        Labs:    I personally reviewed all applicable laboratory data and went over findings with patient  Significant for:    CBC RESULTS:  Recent Labs   Lab Test 07/30/24  0805 08/23/23  1530 04/20/21  1443 02/21/19  1552   WBC 15.4* 11.0 10.2 10.9   HGB 13.8 15.1 14.4 14.3    353 353 342        BMP RESULTS:  Recent Labs   Lab Test 07/30/24  0805 08/23/23  1530 05/19/22  0851 04/20/21  1443 02/21/19  1555 02/21/19  1552    139 141 140  --  139   POTASSIUM 4.1 4.2 4.2 4.3  --  3.9   CHLORIDE 104 102 104 105  --  105   CO2 25 23 28 24  --  24   ANIONGAP 12 14 9 11  --  10   * 86 80 83   < > 83   BUN 12.8 12.7 10 10  --  15   CR 1.07 1.05 0.89 0.99   < > 0.98   GFRESTIMATED >90 >90 >90 >60  --  >60   GFRESTBLACK  --   --   --  >60  --  >60   KATIE 9.2 9.8 9.7 9.5  --  9.2    < > = values in this interval not displayed.       UA RESULTS:   Recent Labs   Lab Test 07/30/24  0759 08/23/23  1534   SG 1.008 >=1.030   URINEPH 7.5* 5.0   NITRITE Negative Negative   RBCU 0  --    WBCU 0  --        CALCIUM RESULTS  Lab Results   Component Value Date    KATIE 9.2 07/30/2024    KATIE 9.8 08/23/2023    KATIE 9.7 05/19/2022           Imaging:    I personally reviewed all applicable imaging and went over the below findings with patient.    Results for orders placed or performed during the hospital encounter of 07/30/24   CT Abdomen Pelvis w/o Contrast    Narrative    EXAM: CT ABDOMEN PELVIS W/O CONTRAST  LOCATION: [facility]  DATE:  7/30/2024    INDICATION: Left flank pain.  COMPARISON: None.  TECHNIQUE: CT scan of the abdomen and pelvis was performed without IV contrast. Multiplanar reformats were obtained. Dose reduction techniques were used.  CONTRAST: None.    FINDINGS:   LOWER CHEST: Mild basilar atelectasis.    HEPATOBILIARY: Mild hepatic steatosis. No opaque gallstones.    PANCREAS: Normal.    SPLEEN: Normal.    ADRENAL GLANDS: Normal.    KIDNEYS/BLADDER: 2 mm stone just past the left ureterovesicular junction extending into the bladder (series 3, image 207). 2 mm nonobstructing left renal interpolar stone. A few other subtle bilateral renal papillary tip hyperdensities (1 mm in size)   without additional well-formed stones.    BOWEL: Negative bowel and appendix.    LYMPH NODES: No adenopathy.    VASCULATURE: Nonaneurysmal aorta.    PELVIC ORGANS: Normal.    MUSCULOSKELETAL: 3.4 x 9 x 10.3 cm elongated lipoma along the proximal and lateral right thigh musculature. Small fat-containing umbilical hernia.      Impression    IMPRESSION:     1.  A 2 mm stone projects just beyond the left ureterovesicular junction into the urinary bladder (either almost passed or recently passed into the bladder). Minimal left hydronephrosis.    2.  Nonobstructing 2 mm left renal stone. A few other subtle tiny renal papillary tip hyperdensities, though no other well-formed stones.    3.  Mild hepatic steatosis.    4.  No free fluid or air.

## 2024-09-24 ENCOUNTER — VIRTUAL VISIT (OUTPATIENT)
Dept: UROLOGY | Facility: CLINIC | Age: 39
End: 2024-09-24
Payer: COMMERCIAL

## 2024-09-24 ENCOUNTER — TELEPHONE (OUTPATIENT)
Dept: UROLOGY | Facility: CLINIC | Age: 39
End: 2024-09-24

## 2024-09-24 VITALS — WEIGHT: 275 LBS | HEIGHT: 70 IN | BODY MASS INDEX: 39.37 KG/M2

## 2024-09-24 DIAGNOSIS — N20.0 CALCIUM OXALATE KIDNEY STONES: Primary | ICD-10-CM

## 2024-09-24 DIAGNOSIS — N20.0 NEPHROLITHIASIS: ICD-10-CM

## 2024-09-24 PROCEDURE — 99213 OFFICE O/P EST LOW 20 MIN: CPT | Mod: 95 | Performed by: NURSE PRACTITIONER

## 2024-09-24 ASSESSMENT — PAIN SCALES - GENERAL: PAINLEVEL: NO PAIN (0)

## 2024-09-24 NOTE — TELEPHONE ENCOUNTER
Sent order for 24 hour urine kit x 1 to [employer] via [employer]. Preclick message sent to patient with instructions for completion.

## 2024-09-24 NOTE — NURSING NOTE
Current patient location: [address] [place] [id number]    Is the patient currently in the state of [place]? YES    Visit mode:VIDEO    If the visit is dropped, the patient can be reconnected by: VIDEO VISIT: Send to e-mail at: [email or web address]    Will anyone else be joining the visit? NO  (If patient encounters technical issues they should call [phone]    How would you like to obtain your AVS? MyChart    Are changes needed to the allergy or medication list? No    Are refills needed on medications prescribed by this physician? NO    Reason for visit: HIRAL Torres MA

## 2024-09-24 NOTE — PATIENT INSTRUCTIONS
"UROLOGY CLINIC VISIT PATIENT INSTRUCTIONS    -Recommend the following dietary changes:     Adequate fluid intake of 90-120oz per day.   Low sodium diet with goal of <2000mg per day  Low oxalate diet. Recommend to eliminate cranberry juice from diet.   Adequate dietary calcium at meal times.     Review diet for foods that may be high in oxalate.  [email or web address]  Below is a link to a PDF that is based on [employer] research. Please stick to pages 6-9 of this document. My suggestion is to review the list of food that is OK. The \"avoid\" list can be overwhelming.  [email or web address]    Non-Dairy Calcium Sources:    Fortified Coconut, Rice, Flax, Oat milk  (avoid almond milk)    Calcium fortified Orange Juice  - look for a low sugar/light variety    Canned sardines, canned pink salmon with bones  - look for low sodium options    Fortified cereals  Oatmeal    Broccoli, peas, chinese cabbage/bok dong, Kale, mustard greens, pistachio nuts, mung beans, red kidney beans     If you have any issues, questions or concerns in the meantime, do not hesitate to contact us at [facility] at [phone] or via [employer].     Fadia Cardenas, CNP  Department of Urology     "

## 2024-09-24 NOTE — PROGRESS NOTES
Urology Video Office Visit    Video-Visit Details    Type of service:  Video Visit    Video Start Time: 1000    Video End Time: 1015    Originating Location (pt. Location): Home    Distant Location (provider location):  Off-site     Platform used for Video Visit: [employer]           Assessment and Plan:     Assessment:39 year old male with history of CaOx stones.     Plan:  -Reviewed 24 hour urine study. Noted hypercalcuria, hyperoxaluria, and high urinary sodium.   -Recommend adequate fluid intake of 90-120oz per day, low sodium diet of <2000mg per day,  low oxalate diet (recommend to cut out cranberry juice), and to get adequate sources of calcium at mealtimes.  -Recommend repeat 24 hour urine study after dietary changes. Pt amenable to plan. Will send litholink kit x 1.   -RTC in 6 weeks to review 24 hour urine study.     Fadia Cardenas CNP  Department of Urology  September 24, 2024    I spent a total of 20 minutes spent on the date of the encounter doing chart review, history and exam, documentation, and further activities as noted above.          Chief Complaint:   Stone Prevention         History of Present Illness:    Ishmael Petersen is a pleasant 39 year old male who presents for follow up stone prevention.     Mr. Petersen was last seen on 08/14/24 for a left UVJ stone which he was able to pass spontaneously.     Stone Analysis: CaOx     CT scan on 7/30/24 (images personally reviewed) revealed a left 2mm UVJ stone and a left punctate nonobstructing stone. No noted right nephrolithiasis or hydronephrosis.     This was his first stone episode. Unknown for family history of stones.      Stone Risk Factors: None     Does drink about 2-3 L of fluids per day. Will drink water, soda (Coke or Mountain Dew, and juice (Cranberry).     Ishmael Petersen's diet consists of 1-2 meals per day.    1st meal: Breakfast sandwich, hashbrowns  2nd meal: Tacos, burgers.   Snacks: pretzels or a type of salty  snack    Does have a dairy intolerance. Will use a lactaid prior to eating dairy. Does tolerate the harder cheeses.     24 hour urine study revealed hypercalcuria, hyperoxaluria, and high urinary sodium.               Past Medical History:     Past Medical History:   Diagnosis Date    Arthritis     Depression     Hypertension     Sleep apnea             Past Surgical History:     Past Surgical History:   Procedure Laterality Date    ARTHROSCOPY KNEE Left     VASECTOMY              Medications     Current Outpatient Medications   Medication Sig Dispense Refill    albuterol (PROAIR HFA/PROVENTIL HFA/VENTOLIN HFA) 108 (90 Base) MCG/ACT inhaler Inhale 2 puffs into the lungs every 6 hours as needed for shortness of breath, wheezing or cough 18 g 1    azelastine (ASTELIN) 0.1 % nasal spray Spray 2 sprays into both nostrils 2 times daily 30 mL 3    cetirizine 10 mg TbDL [CETIRIZINE 10 MG TBDL] Take by mouth.      cyclobenzaprine (FLEXERIL) 10 MG tablet TAKE 1 TABLET BY MOUTH 3 TIMES DAILY AS NEEDED FOR MUSCLE SPASMS 30 tablet 0    hydrOXYzine jm (VISTARIL) 25 MG capsule Take 1 capsule (25 mg) by mouth 3 times daily as needed for anxiety 20 capsule 1    ketoconazole (NIZORAL) 2 % external shampoo [KETOCONAZOLE (NIZORAL) 2 % SHAMPOO] APPLY TO DAMP SKIN, LATHER, LEAVE ON 5 MINUTES, AND RINSE 120 mL 3    omeprazole (PRILOSEC) 20 MG DR capsule TAKE 1 CAPSULE BY MOUTH EVERY DAY 90 capsule 1    sertraline (ZOLOFT) 100 MG tablet Take 2 tablets (200 mg) by mouth daily 180 tablet 3    SUMAtriptan (IMITREX) 25 MG tablet Take 1 tablet (25 mg) by mouth every 2 hours as needed for migraine 10 tablet 3     No current facility-administered medications for this visit.            Family History:     Family History   Problem Relation Age of Onset    Hypertension Mother     Sleep Apnea Father     Diabetes Brother         type 1 diabetes    Diabetes Paternal Grandmother     Seizure Disorder Daughter             Social History:     Social  History     Socioeconomic History    Marital status:      Spouse name: Not on file    Number of children: Not on file    Years of education: Not on file    Highest education level: Not on file   Occupational History    Not on file   Tobacco Use    Smoking status: Never     Passive exposure: Never    Smokeless tobacco: Never   Vaping Use    Vaping status: Never Used   Substance and Sexual Activity    Alcohol use: Yes     Comment: one/month    Drug use: Never    Sexual activity: Yes     Partners: Female     Birth control/protection: Male Surgical     Comment:    Other Topics Concern    Parent/sibling w/ CABG, MI or angioplasty before 65F 55M? No   Social History Narrative    Not on file     Social Determinants of Health     Financial Resource Strain: Low Risk  (7/1/2024)    Financial Resource Strain     Within the past 12 months, have you or your family members you live with been unable to get utilities (heat, electricity) when it was really needed?: No   Food Insecurity: Low Risk  (7/1/2024)    Food Insecurity     Within the past 12 months, did you worry that your food would run out before you got money to buy more?: No     Within the past 12 months, did the food you bought just not last and you didn t have money to get more?: No   Transportation Needs: Low Risk  (7/1/2024)    Transportation Needs     Within the past 12 months, has lack of transportation kept you from medical appointments, getting your medicines, non-medical meetings or appointments, work, or from getting things that you need?: No   Physical Activity: Not on file   Stress: Not on file   Social Connections: Not on file   Interpersonal Safety: Not on file   Housing Stability: Low Risk  (7/1/2024)    Housing Stability     Do you have housing? : Yes     Are you worried about losing your housing?: No            Allergies:   Aspartame         Review of Systems:  From intake questionnaire   Negative 14 system review except as noted on HPI,  nurse's note.         Physical Exam:   General Appearance: Well groomed, hygenic  Eyes: No redness, discharge  Respiratory: No cough, no respiratory distress or labored breathing  Musculoskeletal: Grossly normal, full range of motion in upper extremities, no gross deficits  Skin: No discoloration or apparent rashes  Neurologic - No tremors  Psychiatric - Alert and oriented  The rest of a comprehensive physical examination is deferred due to video visit restrictions        Labs:    I personally reviewed all applicable laboratory data and went over findings with patient  Significant for:    CBC RESULTS:  Recent Labs   Lab Test 07/30/24  0805 08/23/23  1530 04/20/21  1443 02/21/19  1552   WBC 15.4* 11.0 10.2 10.9   HGB 13.8 15.1 14.4 14.3    353 353 342        BMP RESULTS:  Recent Labs   Lab Test 07/30/24  0805 08/23/23  1530 05/19/22  0851 04/20/21  1443 02/21/19  1555 02/21/19  1552    139 141 140  --  139   POTASSIUM 4.1 4.2 4.2 4.3  --  3.9   CHLORIDE 104 102 104 105  --  105   CO2 25 23 28 24  --  24   ANIONGAP 12 14 9 11  --  10   * 86 80 83   < > 83   BUN 12.8 12.7 10 10  --  15   CR 1.07 1.05 0.89 0.99   < > 0.98   GFRESTIMATED >90 >90 >90 >60  --  >60   GFRESTBLACK  --   --   --  >60  --  >60   KATIE 9.2 9.8 9.7 9.5  --  9.2    < > = values in this interval not displayed.       UA RESULTS:   Recent Labs   Lab Test 07/30/24  0759 08/23/23  1534   SG 1.008 >=1.030   URINEPH 7.5* 5.0   NITRITE Negative Negative   RBCU 0  --    WBCU 0  --        CALCIUM RESULTS  Lab Results   Component Value Date    KATIE 9.2 07/30/2024    KATIE 9.8 08/23/2023    KATIE 9.7 05/19/2022           Imaging:    I personally reviewed all applicable imaging and went over the below findings with patient.    Results for orders placed or performed during the hospital encounter of 07/30/24   CT Abdomen Pelvis w/o Contrast    Narrative    EXAM: CT ABDOMEN PELVIS W/O CONTRAST  LOCATION: [facility]  DATE:  7/30/2024    INDICATION: Left flank pain.  COMPARISON: None.  TECHNIQUE: CT scan of the abdomen and pelvis was performed without IV contrast. Multiplanar reformats were obtained. Dose reduction techniques were used.  CONTRAST: None.    FINDINGS:   LOWER CHEST: Mild basilar atelectasis.    HEPATOBILIARY: Mild hepatic steatosis. No opaque gallstones.    PANCREAS: Normal.    SPLEEN: Normal.    ADRENAL GLANDS: Normal.    KIDNEYS/BLADDER: 2 mm stone just past the left ureterovesicular junction extending into the bladder (series 3, image 207). 2 mm nonobstructing left renal interpolar stone. A few other subtle bilateral renal papillary tip hyperdensities (1 mm in size)   without additional well-formed stones.    BOWEL: Negative bowel and appendix.    LYMPH NODES: No adenopathy.    VASCULATURE: Nonaneurysmal aorta.    PELVIC ORGANS: Normal.    MUSCULOSKELETAL: 3.4 x 9 x 10.3 cm elongated lipoma along the proximal and lateral right thigh musculature. Small fat-containing umbilical hernia.      Impression    IMPRESSION:     1.  A 2 mm stone projects just beyond the left ureterovesicular junction into the urinary bladder (either almost passed or recently passed into the bladder). Minimal left hydronephrosis.    2.  Nonobstructing 2 mm left renal stone. A few other subtle tiny renal papillary tip hyperdensities, though no other well-formed stones.    3.  Mild hepatic steatosis.    4.  No free fluid or air.

## 2024-09-29 ENCOUNTER — HEALTH MAINTENANCE LETTER (OUTPATIENT)
Age: 39
End: 2024-09-29

## 2024-10-06 ENCOUNTER — TRANSFERRED RECORDS (OUTPATIENT)
Dept: HEALTH INFORMATION MANAGEMENT | Facility: CLINIC | Age: 39
End: 2024-10-06
Payer: COMMERCIAL

## 2024-11-04 NOTE — PROGRESS NOTES
Urology Video Office Visit    Video-Visit Details    Type of service:  Video Visit    Video Start Time: 1235    Video End Time: 1254    Originating Location (pt. Location): Home    Distant Location (provider location):  Off-site     Platform used for Video Visit: Evangel[employer]           Assessment and Plan:     Assessment:39 year old male with      Plan:  -Recommend to repeat 24 hour urine study with dietary changes including fluid intake of 90-120oz per day, low sodium diet of <2000mg per day, low oxalate diet, adequate dietary calcium of 800-1000mg per day, and low animal protein intake with increase in fruit/vegetable. If noted, similar results on repeat 24 hour urine study will start on thiazide to help decrease urinary calcium.   -RTC in 6-8 weeks.    Fadia Cardenas CNP  Department of Urology  November 4, 2024    I spent a total of 30 minutes spent on the date of the encounter doing chart review, history and exam, documentation, and further activities as noted above.          Chief Complaint:   Stone prevention         History of Present Illness:    Ishmael Petersen is a pleasant 39 year old male who presents for follow up stone prevention.     Mr. Petersen was last seen with Urology in Sept 2024.       History of a stone episode in August 2024 which he was able to pass and retrieve.   Stone Analysis: CaOx     CT scan on 7/30/24 (images personally reviewed) revealed a left 2mm UVJ stone and a left punctate nonobstructing stone. No noted right nephrolithiasis or hydronephrosis.      This was his first stone episode. Unknown for family history of stones.      Stone Risk Factors: None     Does drink about 2-3 L of fluids per day. Will drink water, soda (Coke or Mountain Dew, and juice (Cranberry).      Ishmael Petersen's diet consists of 1-2 meals per day.    1st meal: Breakfast sandwich, hashbrowns  2nd meal: Tacos, burgers.   Snacks: pretzels or a type of salty snack     Does have a dairy  intolerance. Will use a lactaid prior to eating dairy. Does tolerate the harder cheeses.      He has stopped drinking cranberry juice and is working with a nutritionist to eat several meals per day vs 1 large meal per day.     Diet day of 24 hour urine study consisted of the following:  Ham potato soup  Couple pieces meat lovers pizza  Crackers and cheese    24 hour urine study noted lower urinary volume, hypercalcuria, increase in hyperoxaluria, and increase in uricosuria. Noted higher urinary sodium.                Past Medical History:     Past Medical History:   Diagnosis Date    Arthritis     Depression     Hypertension     Sleep apnea             Past Surgical History:     Past Surgical History:   Procedure Laterality Date    ARTHROSCOPY KNEE Left     VASECTOMY              Medications     Current Outpatient Medications   Medication Sig Dispense Refill    albuterol (PROAIR HFA/PROVENTIL HFA/VENTOLIN HFA) 108 (90 Base) MCG/ACT inhaler Inhale 2 puffs into the lungs every 6 hours as needed for shortness of breath, wheezing or cough 18 g 1    azelastine (ASTELIN) 0.1 % nasal spray Spray 2 sprays into both nostrils 2 times daily 30 mL 3    cetirizine 10 mg TbDL [CETIRIZINE 10 MG TBDL] Take by mouth.      cyclobenzaprine (FLEXERIL) 10 MG tablet TAKE 1 TABLET BY MOUTH 3 TIMES DAILY AS NEEDED FOR MUSCLE SPASMS 30 tablet 0    hydrOXYzine jm (VISTARIL) 25 MG capsule Take 1 capsule (25 mg) by mouth 3 times daily as needed for anxiety 20 capsule 1    ketoconazole (NIZORAL) 2 % external shampoo [KETOCONAZOLE (NIZORAL) 2 % SHAMPOO] APPLY TO DAMP SKIN, LATHER, LEAVE ON 5 MINUTES, AND RINSE 120 mL 3    omeprazole (PRILOSEC) 20 MG DR capsule TAKE 1 CAPSULE BY MOUTH EVERY DAY 90 capsule 1    sertraline (ZOLOFT) 100 MG tablet Take 2 tablets (200 mg) by mouth daily 180 tablet 3    SUMAtriptan (IMITREX) 25 MG tablet Take 1 tablet (25 mg) by mouth every 2 hours as needed for migraine 10 tablet 3     No current  facility-administered medications for this visit.            Family History:     Family History   Problem Relation Age of Onset    Hypertension Mother     Sleep Apnea Father     Diabetes Brother         type 1 diabetes    Diabetes Paternal Grandmother     Seizure Disorder Daughter             Social History:     Social History     Socioeconomic History    Marital status:      Spouse name: Not on file    Number of children: Not on file    Years of education: Not on file    Highest education level: Not on file   Occupational History    Not on file   Tobacco Use    Smoking status: Never     Passive exposure: Never    Smokeless tobacco: Never   Vaping Use    Vaping status: Never Used   Substance and Sexual Activity    Alcohol use: Yes     Comment: one/month    Drug use: Never    Sexual activity: Yes     Partners: Female     Birth control/protection: Male Surgical     Comment:    Other Topics Concern    Parent/sibling w/ CABG, MI or angioplasty before 65F 55M? No   Social History Narrative    Not on file     Social Drivers of Health     Financial Resource Strain: Low Risk  (7/1/2024)    Financial Resource Strain     Within the past 12 months, have you or your family members you live with been unable to get utilities (heat, electricity) when it was really needed?: No   Food Insecurity: Low Risk  (7/1/2024)    Food Insecurity     Within the past 12 months, did you worry that your food would run out before you got money to buy more?: No     Within the past 12 months, did the food you bought just not last and you didn t have money to get more?: No   Transportation Needs: Low Risk  (7/1/2024)    Transportation Needs     Within the past 12 months, has lack of transportation kept you from medical appointments, getting your medicines, non-medical meetings or appointments, work, or from getting things that you need?: No   Physical Activity: Not on file   Stress: Not on file   Social Connections: Not on file    Interpersonal Safety: Not on file   Housing Stability: Low Risk  (7/1/2024)    Housing Stability     Do you have housing? : Yes     Are you worried about losing your housing?: No            Allergies:   Aspartame         Review of Systems:  From intake questionnaire   Negative 14 system review except as noted on HPI, nurse's note.         Physical Exam:   General Appearance: Well groomed, hygenic  Eyes: No redness, discharge  Respiratory: No cough, no respiratory distress or labored breathing  Musculoskeletal: Grossly normal, full range of motion in upper extremities, no gross deficits  Skin: No discoloration or apparent rashes  Neurologic - No tremors  Psychiatric - Alert and oriented  The rest of a comprehensive physical examination is deferred due to video visit restrictions        Labs:    I personally reviewed all applicable laboratory data and went over findings with patient  Significant for:    CBC RESULTS:  Recent Labs   Lab Test 07/30/24  0805 08/23/23  1530 04/20/21  1443 02/21/19  1552   WBC 15.4* 11.0 10.2 10.9   HGB 13.8 15.1 14.4 14.3    353 353 342        BMP RESULTS:  Recent Labs   Lab Test 07/30/24  0805 08/23/23  1530 05/19/22  0851 04/20/21  1443 02/21/19  1555 02/21/19  1552    139 141 140  --  139   POTASSIUM 4.1 4.2 4.2 4.3  --  3.9   CHLORIDE 104 102 104 105  --  105   CO2 25 23 28 24  --  24   ANIONGAP 12 14 9 11  --  10   * 86 80 83   < > 83   BUN 12.8 12.7 10 10  --  15   CR 1.07 1.05 0.89 0.99   < > 0.98   GFRESTIMATED >90 >90 >90 >60  --  >60   GFRESTBLACK  --   --   --  >60  --  >60   KATIE 9.2 9.8 9.7 9.5  --  9.2    < > = values in this interval not displayed.       UA RESULTS:   Recent Labs   Lab Test 07/30/24  0759 08/23/23  1534   SG 1.008 >=1.030   URINEPH 7.5* 5.0   NITRITE Negative Negative   RBCU 0  --    WBCU 0  --        CALCIUM RESULTS  Lab Results   Component Value Date    KATIE 9.2 07/30/2024    KATIE 9.8 08/23/2023    KATIE 9.7 05/19/2022           Imaging:     I personally reviewed all applicable imaging and went over the below findings with patient.    Results for orders placed or performed during the hospital encounter of 07/30/24   CT Abdomen Pelvis w/o Contrast    Narrative    EXAM: CT ABDOMEN PELVIS W/O CONTRAST  LOCATION: [facility]  DATE: 7/30/2024    INDICATION: Left flank pain.  COMPARISON: None.  TECHNIQUE: CT scan of the abdomen and pelvis was performed without IV contrast. Multiplanar reformats were obtained. Dose reduction techniques were used.  CONTRAST: None.    FINDINGS:   LOWER CHEST: Mild basilar atelectasis.    HEPATOBILIARY: Mild hepatic steatosis. No opaque gallstones.    PANCREAS: Normal.    SPLEEN: Normal.    ADRENAL GLANDS: Normal.    KIDNEYS/BLADDER: 2 mm stone just past the left ureterovesicular junction extending into the bladder (series 3, image 207). 2 mm nonobstructing left renal interpolar stone. A few other subtle bilateral renal papillary tip hyperdensities (1 mm in size)   without additional well-formed stones.    BOWEL: Negative bowel and appendix.    LYMPH NODES: No adenopathy.    VASCULATURE: Nonaneurysmal aorta.    PELVIC ORGANS: Normal.    MUSCULOSKELETAL: 3.4 x 9 x 10.3 cm elongated lipoma along the proximal and lateral right thigh musculature. Small fat-containing umbilical hernia.      Impression    IMPRESSION:     1.  A 2 mm stone projects just beyond the left ureterovesicular junction into the urinary bladder (either almost passed or recently passed into the bladder). Minimal left hydronephrosis.    2.  Nonobstructing 2 mm left renal stone. A few other subtle tiny renal papillary tip hyperdensities, though no other well-formed stones.    3.  Mild hepatic steatosis.    4.  No free fluid or air.

## 2024-11-05 ENCOUNTER — VIRTUAL VISIT (OUTPATIENT)
Dept: UROLOGY | Facility: CLINIC | Age: 39
End: 2024-11-05
Payer: COMMERCIAL

## 2024-11-05 DIAGNOSIS — R82.994 HYPERCALCIURIA: ICD-10-CM

## 2024-11-05 DIAGNOSIS — R34 LOW URINE OUTPUT: ICD-10-CM

## 2024-11-05 DIAGNOSIS — N20.0 CALCIUM OXALATE KIDNEY STONES: ICD-10-CM

## 2024-11-05 DIAGNOSIS — N20.0 NEPHROLITHIASIS: Primary | ICD-10-CM

## 2024-11-05 DIAGNOSIS — R82.992 HYPEROXALURIA: ICD-10-CM

## 2024-11-05 PROCEDURE — 99213 OFFICE O/P EST LOW 20 MIN: CPT | Mod: 95 | Performed by: NURSE PRACTITIONER

## 2024-11-05 NOTE — NURSING NOTE
Current patient location: [place]    Is the patient currently in the state of [place]? YES    Visit mode:VIDEO    If the visit is dropped, the patient can be reconnected by: VIDEO VISIT: Text to cell phone:   Telephone Information:   Mobile [phone]       Will anyone else be joining the visit? NO  (If patient encounters technical issues they should call [phone])    Are changes needed to the allergy or medication list? No  C[facility] reported no changes to e-check in information for visit.  did not review e-check in information again with [facility] due to this.         Are refills needed on medications prescribed by this physician? Discuss with provider    Rooming Documentation:  Not applicable    Reason for visit: RECHECK    Mariia AGARWAL

## 2024-11-05 NOTE — PATIENT INSTRUCTIONS
UROLOGY CLINIC VISIT PATIENT INSTRUCTIONS    -Will repeat 24 hour urine study with dietary changes. Please make sure to drink at least 120oz of fluid, follow low sodium diet <2000mg per day, low oxalate diet, increase fruit/vegetables with less animal protein (8-10oz), and increase dietary calcium the day of study.     If you have any issues, questions or concerns in the meantime, do not hesitate to contact us at [facility] at [phone] or via [employer].     Fadia Cardenas CNP  Department of Urology

## 2024-11-06 ENCOUNTER — TELEPHONE (OUTPATIENT)
Dept: UROLOGY | Facility: CLINIC | Age: 39
End: 2024-11-06
Payer: COMMERCIAL

## 2024-11-06 NOTE — TELEPHONE ENCOUNTER
Sent order for 24 hour urine kit x 1 to [employer] via [employer]. [employer] message sent to patient with instructions for completion.

## 2024-11-14 ENCOUNTER — MYC MEDICAL ADVICE (OUTPATIENT)
Dept: FAMILY MEDICINE | Facility: CLINIC | Age: 39
End: 2024-11-14
Payer: COMMERCIAL

## 2024-12-02 ENCOUNTER — NURSE TRIAGE (OUTPATIENT)
Dept: FAMILY MEDICINE | Facility: CLINIC | Age: 39
End: 2024-12-02

## 2024-12-02 DIAGNOSIS — U07.1 INFECTION DUE TO 2019 NOVEL CORONAVIRUS: Primary | ICD-10-CM

## 2024-12-02 NOTE — TELEPHONE ENCOUNTER
Nurse Triage SBAR    Is this a 2nd Level Triage? NO, but Licensed provider review is requested    Situation:  Telephone call into the clinic to report a positive COVID-19 test with current COVID-19 symptoms, on 12/2/24, Monday    Background:  Hx of obesity, JOHN, and HLD    Assessment:  Patient reports testing positive for COVID-19 today, Monday, 12/2/24, with current COVID-19 symptoms starting on Saturday, 11/30/24    Current symptoms:  Cough, congestion, runny nose, sore throat    Denies chills, fatigue, headache, loss of smell or taste, muscle pain, chest pain, dizziness, lightheadedness, vomiting, diarrhea, nausea, dysuria, earache, or changes to vision    Patient would like a prescription for Paxlovid for current COVID-19 symptoms    Protocol Recommended Disposition:   Discuss With PCP And Callback By Nurse Within 1 Hour    Recommendation:   Gave care advice. Patient verbalized understanding and agrees with the plan.    Writer canceled the patient's appointment with the PCP today, 12/2/24, at 10:00 am, due to current COVID-19 infection.    Writer assisted the patient with scheduling an appointment with the PCP, on Wednesday, 12/11/24, at 12:40 am, for an annual exam and to discuss GERD    Writer sent a prescription for the Paxlovid to the [facility] in [place], [place] at [address]     Denies other questions or concerns at this time.    Routed to provider    Does the patient meet one of the following criteria for ADS visit consideration? 16+ years old, with an [facility] PCP     TIP  Providers, please consider if this condition is appropriate for management at one of our Acute and Diagnostic Services sites.     If patient is a good candidate, please use dotphrase <dot>triageresponse and select Refer to ADS to document.     RN COVID TREATMENT VISIT  12/02/24      The patient has been triaged and does not require a higher level of care.    Ishmael Petersen  39 year old  Current weight? 265-270#    Has the  patient been seen by a primary care provider at an [facility] or [facility] within the past two years? Yes.   Have you been in close proximity to/do you have a known exposure to a person with a confirmed case of influenza? No.     General treatment eligibility:  Date of positive COVID test (PCR or at home)?  12/2/24    Are you or have you been hospitalized for this COVID-19 infection? No.   Have you received monoclonal antibodies or antiviral treatment for COVID-19 since this positive test? No.   Do you have any of the following conditions that place you at risk of being very sick from COVID-19?   - Heart conditions such as cardiomyopathies, congenital heart defects, coronary artery disease, heart arrhythmias, heart failure, hypertension, valve disorders   - Overweight or Obesity (BMI >85th percentile or BMI 25 or higher)  Yes, patient has at least one high risk condition as noted above.     Current COVID symptoms:   - cough  - sore throat  - congestion and runny nose  Yes. Patient has at least one symptom as selected.     How many days since symptoms started? 5 days or less. Established patient, 12 years or older weighing at least 88.2 lbs, who has symptoms that started in the past 5 days, has not been hospitalized nor received treatment already, and is at risk for being very sick from COVID-19.     Treatment eligibility by RN:  Are you currently pregnant or nursing? No  Do you have a clinically significant hypersensitivity to nirmatrelvir or ritonavir, or toxic epidermal necrolysis (TEN) or De La Fuente-Lacho Syndrome? No  Do you have a history of hepatitis, any hepatic impairment on the Problem List (such as Child-Cervantes Class C, cirrhosis, fatty liver disease, alcoholic liver disease), or was the last liver lab (hepatic panel, ALT, AST, ALK Phos, bilirubin) elevated in the past 6 months? No  Do you have any history of severe renal impairment (eGFR < 30mL/min)? No    Is patient eligible to  continue? Yes, patient meets all eligibility requirements for the RN COVID treatment (as denoted by all no responses above).     Current Outpatient Medications   Medication Sig Dispense Refill    albuterol (PROAIR HFA/PROVENTIL HFA/VENTOLIN HFA) 108 (90 Base) MCG/ACT inhaler Inhale 2 puffs into the lungs every 6 hours as needed for shortness of breath, wheezing or cough 18 g 1    azelastine (ASTELIN) 0.1 % nasal spray Spray 2 sprays into both nostrils 2 times daily 30 mL 3    cetirizine 10 mg TbDL [CETIRIZINE 10 MG TBDL] Take by mouth.      cyclobenzaprine (FLEXERIL) 10 MG tablet TAKE 1 TABLET BY MOUTH 3 TIMES DAILY AS NEEDED FOR MUSCLE SPASMS 30 tablet 0    hydrOXYzine jm (VISTARIL) 25 MG capsule Take 1 capsule (25 mg) by mouth 3 times daily as needed for anxiety 20 capsule 1    ketoconazole (NIZORAL) 2 % external shampoo [KETOCONAZOLE (NIZORAL) 2 % SHAMPOO] APPLY TO DAMP SKIN, LATHER, LEAVE ON 5 MINUTES, AND RINSE 120 mL 3    omeprazole (PRILOSEC) 20 MG DR capsule TAKE 1 CAPSULE BY MOUTH EVERY DAY 90 capsule 1    sertraline (ZOLOFT) 100 MG tablet Take 2 tablets (200 mg) by mouth daily 180 tablet 3    SUMAtriptan (IMITREX) 25 MG tablet Take 1 tablet (25 mg) by mouth every 2 hours as needed for migraine 10 tablet 3       Medications from List 1 of the standing order (on medications that exclude the use of Paxlovid) that patient is taking: NONE. Is patient taking Worcester's Wort? No  Is patient taking Worcester's Wort or any meds from List 1? No.   Medications from List 2 of the standing order (on meds that provider needs to adjust) that patient is taking: NONE. Is patient on any of the meds from List 2? No.   Medications from List 3 of standing order (on meds that a RN needs to adjust) that patient is taking: NONE. Is patient on any meds from List 3? No.     Paxlovid has an approximate 90% reduction in hospitalization. Paxlovid can possibly cause altered sense of taste, diarrhea (loose, watery stools), high blood  pressure, muscle aches.     Would patient like a Paxlovid prescription?   Yes.   Lab Results   Component Value Date    GFRESTIMATED >90 07/30/2024       Was last eGFR reduced? No, eGFR 60 or greater/ No Result on record. Patient can receive the normal renal function dose. Paxlovid Rx sent to [facility]    [id number] [place] [address]    Temporary change to home medications: None    All medication adjustments (holds, etc) were discussed with the patient and patient was asked to repeat back (teachback) their med adjustment.  Did patient understand med adjustment? No medication adjustments needed.         Reviewed the following instructions with the patient:    Paxlovid (nimatrelvir and ritonavir)    How it works  Two medicines (nirmatrelvir and ritonavir) are taken together. They stop the virus from growing. Less amount of virus is easier for your body to fight.    How to take  Medicine comes in a daily container with both medicine tablets. Take by mouth twice daily (once in the morning, once at night) for 5 days.  The number of tablets to take varies by patient.  Don't chew or break capsules. Swallow whole.    When to take  Take as soon as possible after positive COVID-19 test result, and within 5 days of your first symptoms.    Possible side effects  Can cause altered sense of taste, diarrhea (loose, watery stools), high blood pressure, muscle aches.    Lab Results   Component Value Date    ALT 50 08/23/2023      AST   Date Value Ref Range Status   08/23/2023 37 0 - 45 U/L Final     Comment:     Reference intervals for this test were updated on 6/12/2023 to more accurately reflect our healthy population. There may be differences in the flagging of prior results with similar values performed with this method. Interpretation of those prior results can be made in the context of the updated reference intervals.      Lab Results   Component Value Date    ALKPHOS 122 08/23/2023      Recent  Labs   Lab Test 08/23/23  1530   BILITOTAL 0.5      GFR Estimate   Date Value Ref Range Status   07/30/2024 >90 >60 mL/min/1.73m2 Final     Comment:     eGFR calculated using 2021 CKD-EPI equation.   08/23/2023 >90 >60 mL/min/1.73m2 Final   05/19/2022 >90 >60 mL/min/1.73m2 Final     Comment:     Effective December 21, 2021 eGFRcr in adults is calculated using the 2021 CKD-EPI creatinine equation which includes age and gender (Tomasa et al., NEJ, DOI: 10.1056/QHHHyq1035755)   04/20/2021 >60 >60 mL/min/1.73m2 Final   02/21/2019 >60 >60 mL/min/1.73m2 Final        Reason for Disposition   HIGH RISK patient (e.g., weak immune system, age > 64 years, obesity with BMI of 30 or higher, pregnant, chronic lung disease) and COVID symptoms (e.g., cough, fever) (Exceptions: Already seen by doctor or NP/PA and no new or worsening symptoms.)    Additional Information   Negative: SEVERE difficulty breathing (e.g., struggling for each breath, speaks in single words)   Negative: Difficult to awaken or acting confused (e.g., disoriented, slurred speech)   Negative: Bluish (or gray) lips or face now   Negative: Shock suspected (e.g., cold/pale/clammy skin, too weak to stand, low BP, rapid pulse)   Negative: Sounds like a life-threatening emergency to the triager   Negative: COVID-19 vaccine, questions about   Negative: Diagnosed or suspected COVID-19 and symptoms lasting 3 or more weeks   Negative: COVID-19 exposure and no symptoms   Negative: COVID-19 vaccine reaction suspected (e.g., fever, headache, muscle aches) occurring 1 to 3 days after getting vaccine   Negative: Exposure to someone known to have influenza (flu test positive) and flu-like symptoms (e.g., cough, runny nose, sore throat, SOB; with or without fever)   Negative: Possible COVID-19 symptoms and triager concerned about severity of symptoms or other causes   Negative: COVID-19 and breastfeeding, questions about   Negative: SEVERE or constant chest pain or pressure   "(Exception: Mild central chest pain, present only when coughing.)   Negative: MODERATE difficulty breathing (e.g., speaks in phrases, SOB even at rest, pulse 100-120)   Negative: Headache and stiff neck (can't touch chin to chest)   Negative: Oxygen level (e.g., pulse oximetry) 90% or lower   Negative: Chest pain or pressure  (Exception: MILD central chest pain, present only when coughing.)   Negative: Drinking very little and dehydration suspected (e.g., no urine > 12 hours, very dry mouth, very lightheaded)   Negative: Patient sounds very sick or weak to the triager   Negative: Fever > 101 F (38.3 C) and over 60 years of age   Negative: MILD difficulty breathing (e.g., minimal/no SOB at rest, SOB with walking, pulse <100)   Negative: Fever > 103 F (39.4 C)   Negative: Fever > 100 F (37.8 C) and bedridden (e.g., CVA, chronic illness, recovering from surgery)    Answer Assessment - Initial Assessment Questions  1. SYMPTOMS: \"What is your main symptom or concern?\" (e.g., cough, fever, shortness of breath, muscle aches)        Congestion and sore throat    2. ONSET: \"When did the symptoms start?\"         Started on Saturday, 11/30/24    3. COUGH: \"Do you have a cough?\" If Yes, ask: \"How bad is the cough?\"          Yes has a cough    4. FEVER: \"Do you have a fever?\" If Yes, ask: \"What is your temperature, how was it measured, and when did it start?\"        No fever today    5. BREATHING DIFFICULTY: \"Are you having any difficulty breathing?\" (e.g., normal; shortness of breath, wheezing, unable to speak)         None noted    6. BETTER-SAME-WORSE: \"Are you getting better, staying the same or getting worse compared to yesterday?\"  If getting worse, ask, \"In what way?\"        Feels same as yesterday, but better than Saturday    7. OTHER SYMPTOMS: \"Do you have any other symptoms?\"  (e.g., chills, fatigue, headache, loss of smell or taste, muscle pain, sore throat)        Cough, congestion, runny nose, sore " "throat      Denies chills, fatigue, headache, loss of smell or taste, muscle pain, chest pain, dizziness, lightheadedness, vomiting, diarrhea, nausea, dysuria, earache, changes to vision      8. COVID-19 DIAGNOSIS: \"How do you know that you have COVID?\" (e.g., positive lab test or self-test, diagnosed by doctor or NP/PA, symptoms after exposure).        Tested positive for COVID today, 12/2/24    9. COVID-19 EXPOSURE: \"Was there any known exposure to COVID before the symptoms began?\"         None noted    10. COVID-19 VACCINE: \"Have you had the COVID-19 vaccine?\" If Yes, ask: \"When did you last get it?\"          See immunizations    11. HIGH RISK DISEASE: \"Do you have any chronic medical problems?\" (e.g., asthma, heart or lung disease, weak immune system, obesity, etc.)          Obesity, JOHN, HLD    12. PREGNANCY: \"Is there any chance you are pregnant?\" \"When was your last menstrual period?\"          N/A    13. O2 SATURATION MONITOR:  \"Do you use an oxygen saturation monitor (pulse oximeter) at home?\" If Yes, ask \"What is your reading (oxygen level) today?\" \"What is your usual oxygen saturation reading?\" (e.g., 95%)            None noted    Protocols used: COVID-19 - Diagnosed or [id number]    Sharona Moncada RN, BSN  [facility]    "

## 2024-12-11 ENCOUNTER — OFFICE VISIT (OUTPATIENT)
Dept: FAMILY MEDICINE | Facility: CLINIC | Age: 39
End: 2024-12-11
Payer: COMMERCIAL

## 2024-12-11 VITALS
HEIGHT: 70 IN | OXYGEN SATURATION: 97 % | WEIGHT: 265 LBS | DIASTOLIC BLOOD PRESSURE: 88 MMHG | RESPIRATION RATE: 18 BRPM | TEMPERATURE: 98 F | BODY MASS INDEX: 37.94 KG/M2 | SYSTOLIC BLOOD PRESSURE: 146 MMHG | HEART RATE: 75 BPM

## 2024-12-11 DIAGNOSIS — Z83.3 FAMILY HISTORY OF DIABETES MELLITUS: ICD-10-CM

## 2024-12-11 DIAGNOSIS — G47.33 OSA (OBSTRUCTIVE SLEEP APNEA): ICD-10-CM

## 2024-12-11 DIAGNOSIS — F41.1 GENERALIZED ANXIETY DISORDER: ICD-10-CM

## 2024-12-11 DIAGNOSIS — Z00.00 ROUTINE PHYSICAL EXAMINATION: Primary | ICD-10-CM

## 2024-12-11 DIAGNOSIS — K21.9 GASTROESOPHAGEAL REFLUX DISEASE, UNSPECIFIED WHETHER ESOPHAGITIS PRESENT: ICD-10-CM

## 2024-12-11 DIAGNOSIS — J30.2 SEASONAL ALLERGIES: ICD-10-CM

## 2024-12-11 DIAGNOSIS — E78.2 MIXED HYPERLIPIDEMIA: ICD-10-CM

## 2024-12-11 DIAGNOSIS — R03.0 ELEVATED BLOOD PRESSURE READING WITHOUT DIAGNOSIS OF HYPERTENSION: ICD-10-CM

## 2024-12-11 LAB
ERYTHROCYTE [DISTWIDTH] IN BLOOD BY AUTOMATED COUNT: 12.8 % (ref 10–15)
EST. AVERAGE GLUCOSE BLD GHB EST-MCNC: 117 MG/DL
HBA1C MFR BLD: 5.7 % (ref 0–5.6)
HCT VFR BLD AUTO: 41.4 % (ref 40–53)
HGB BLD-MCNC: 14.2 G/DL (ref 13.3–17.7)
MCH RBC QN AUTO: 30 PG (ref 26.5–33)
MCHC RBC AUTO-ENTMCNC: 34.3 G/DL (ref 31.5–36.5)
MCV RBC AUTO: 88 FL (ref 78–100)
PLATELET # BLD AUTO: 347 10E3/UL (ref 150–450)
RBC # BLD AUTO: 4.73 10E6/UL (ref 4.4–5.9)
WBC # BLD AUTO: 11.5 10E3/UL (ref 4–11)

## 2024-12-11 PROCEDURE — 99214 OFFICE O/P EST MOD 30 MIN: CPT | Mod: 25 | Performed by: FAMILY MEDICINE

## 2024-12-11 PROCEDURE — 84443 ASSAY THYROID STIM HORMONE: CPT | Performed by: FAMILY MEDICINE

## 2024-12-11 PROCEDURE — 83036 HEMOGLOBIN GLYCOSYLATED A1C: CPT | Performed by: FAMILY MEDICINE

## 2024-12-11 PROCEDURE — 85027 COMPLETE CBC AUTOMATED: CPT | Performed by: FAMILY MEDICINE

## 2024-12-11 PROCEDURE — 83721 ASSAY OF BLOOD LIPOPROTEIN: CPT | Mod: 59 | Performed by: FAMILY MEDICINE

## 2024-12-11 PROCEDURE — 99396 PREV VISIT EST AGE 40-64: CPT | Performed by: FAMILY MEDICINE

## 2024-12-11 PROCEDURE — 80061 LIPID PANEL: CPT | Performed by: FAMILY MEDICINE

## 2024-12-11 PROCEDURE — 36415 COLL VENOUS BLD VENIPUNCTURE: CPT | Performed by: FAMILY MEDICINE

## 2024-12-11 PROCEDURE — 80053 COMPREHEN METABOLIC PANEL: CPT | Performed by: FAMILY MEDICINE

## 2024-12-11 SDOH — HEALTH STABILITY: PHYSICAL HEALTH: ON AVERAGE, HOW MANY DAYS PER WEEK DO YOU ENGAGE IN MODERATE TO STRENUOUS EXERCISE (LIKE A BRISK WALK)?: 0 DAYS

## 2024-12-11 ASSESSMENT — ASTHMA QUESTIONNAIRES
QUESTION_1 LAST FOUR WEEKS HOW MUCH OF THE TIME DID YOUR ASTHMA KEEP YOU FROM GETTING AS MUCH DONE AT WORK, SCHOOL OR AT HOME: NONE OF THE TIME
ACT_TOTALSCORE: 24
ACT_TOTALSCORE: 24
QUESTION_5 LAST FOUR WEEKS HOW WOULD YOU RATE YOUR ASTHMA CONTROL: COMPLETELY CONTROLLED
QUESTION_3 LAST FOUR WEEKS HOW OFTEN DID YOUR ASTHMA SYMPTOMS (WHEEZING, COUGHING, SHORTNESS OF BREATH, CHEST TIGHTNESS OR PAIN) WAKE YOU UP AT NIGHT OR EARLIER THAN USUAL IN THE MORNING: NOT AT ALL
QUESTION_4 LAST FOUR WEEKS HOW OFTEN HAVE YOU USED YOUR RESCUE INHALER OR NEBULIZER MEDICATION (SUCH AS ALBUTEROL): ONCE A WEEK OR LESS
QUESTION_2 LAST FOUR WEEKS HOW OFTEN HAVE YOU HAD SHORTNESS OF BREATH: NOT AT ALL

## 2024-12-11 ASSESSMENT — SOCIAL DETERMINANTS OF HEALTH (SDOH): HOW OFTEN DO YOU GET TOGETHER WITH FRIENDS OR RELATIVES?: ONCE A WEEK

## 2024-12-11 ASSESSMENT — PAIN SCALES - GENERAL: PAINLEVEL_OUTOF10: NO PAIN (0)

## 2024-12-11 NOTE — PATIENT INSTRUCTIONS
Try riding your bike for 5 to 10 minutes several days a week to see if your back and knees will allow it.  Regular exercise can be incredibly helpful for much of your health including your blood pressure.    Consider seeing a chiropractor to help with your dislocated rib and chest wall pain.  No indication for imaging at this time.    I have placed a referral for upper endoscopy to assess your esophagus and stomach in the setting of reflux.  Gastroenterology will call you to schedule this.  In the meantime, continue omeprazole.    Return to clinic in 2 months to recheck your blood pressure.  Patient Education   Preventive Care Advice   This is general advice given by our system to help you stay healthy. However, your care team may have specific advice just for you. Please talk to your care team about your preventive care needs.  Nutrition  Eat 5 or more servings of fruits and vegetables each day.  Try wheat bread, brown rice and whole grain pasta (instead of white bread, rice, and pasta).  Get enough calcium and vitamin D. Check the label on foods and aim for 100% of the RDA (recommended daily allowance).  Lifestyle  Exercise at least 150 minutes each week  (30 minutes a day, 5 days a week).  Do muscle strengthening activities 2 days a week. These help control your weight and prevent disease.  No smoking.  Wear sunscreen to prevent skin cancer.  Have a dental exam and cleaning every 6 months.  Yearly exams  See your health care team every year to talk about:  Any changes in your health.  Any medicines your care team has prescribed.  Preventive care, family planning, and ways to prevent chronic diseases.  Shots (vaccines)   HPV shots (up to age 26), if you've never had them before.  Hepatitis B shots (up to age 59), if you've never had them before.  COVID-19 shot: Get this shot when it's due.  Flu shot: Get a flu shot every year.  Tetanus shot: Get a tetanus shot every 10 years.  Pneumococcal, hepatitis A, and RSV  shots: Ask your care team if you need these based on your risk.  Shingles shot (for age 50 and up)  General health tests  Diabetes screening:  Starting at age 35, Get screened for diabetes at least every 3 years.  If you are younger than age 35, ask your care team if you should be screened for diabetes.  Cholesterol test: At age 39, start having a cholesterol test every 5 years, or more often if advised.  Bone density scan (DEXA): At age 50, ask your care team if you should have this scan for osteoporosis (brittle bones).  Hepatitis C: Get tested at least once in your life.  STIs (sexually transmitted infections)  Before age 24: Ask your care team if you should be screened for STIs.  After age 24: Get screened for STIs if you're at risk. You are at risk for STIs (including HIV) if:  You are sexually active with more than one person.  You don't use condoms every time.  You or a partner was diagnosed with a sexually transmitted infection.  If you are at risk for HIV, ask about PrEP medicine to prevent HIV.  Get tested for HIV at least once in your life, whether you are at risk for HIV or not.  Cancer screening tests  Cervical cancer screening: If you have a cervix, begin getting regular cervical cancer screening tests starting at age 21.  Breast cancer scan (mammogram): If you've ever had breasts, begin having regular mammograms starting at age 40. This is a scan to check for breast cancer.  Colon cancer screening: It is important to start screening for colon cancer at age 45.  Have a colonoscopy test every 10 years (or more often if you're at risk) Or, ask your provider about stool tests like a FIT test every year or Cologuard test every 3 years.  To learn more about your testing options, visit:   .  For help making a decision, visit:   https://bit.ly/xt42183.  Prostate cancer screening test: If you have a prostate, ask your care team if a prostate cancer screening test (PSA) at age 55 is right for you.  Lung cancer  screening: If you are a current or former smoker ages 50 to 80, ask your care team if ongoing lung cancer screenings are right for you.  For informational purposes only. Not to replace the advice of your health care provider. Copyright   2023 [facility]. All rights reserved. Clinically reviewed by the [facility]. Drivy [id number] - REV 01/24.

## 2024-12-11 NOTE — PROGRESS NOTES
Preventive Care Visit  [facility]  Charlotte Edgar MD, Family Medicine  Dec 11, 2024      Assessment & Plan     Routine physical examination  Encouraged healthy lifestyle habits including regular exercise, healthy eating habits, and adequate calcium and vitamin D intake.  Up-to-date with immunizations.  Will check nonfasting lipids as he is not fasting.  Will check A1c due to family history of diabetes.  - Lipid panel reflex to direct LDL Non-fasting; Future    Gastroesophageal reflux disease, unspecified whether esophagitis present  Significant symptoms of reflux, improved with initiation of omeprazole the lack of adequate resolution.  Referral is made for upper endoscopy.  We will check liver function, CBC today.  - Comprehensive metabolic panel; Future  - CBC with platelets; Future  - Comprehensive metabolic panel  - CBC with platelets    JOHN (obstructive sleep apnea)  Remains compliant with CPAP with good effect.    Mixed hyperlipidemia  Encouraged efforts at healthy lifestyle habits.  This has not required medication.  We will check nonfasting lipids, will check liver function in the event statin is indicated.  - Lipid panel reflex to direct LDL Non-fasting; Future  - Comprehensive metabolic panel; Future  - Lipid panel reflex to direct LDL Non-fasting  - Comprehensive metabolic panel    Seasonal allergies  Overall stable and adequately managed.  He does not have history of asthma, does not require asthma action plan or PCV 20.    Family history of diabetes mellitus  Will check A1c as he is not fasting today.  - Hemoglobin A1c; Future  - Hemoglobin A1c    Generalized anxiety disorder  Stable and controlled on sertraline at current dose.  Continue.    Elevated blood pressure reading without diagnosis of hypertension  He has had this off and on over the last few months.  We discussed options.  He would like to work on healthy lifestyle habits, particularly focusing on reintroduction of  "physical activity starting with, bike use 5 to 10 minutes at a time.  Return to clinic in 2 months to reassess.  Consider EKG and initiation of medication if persistent elevation.  - TSH with free T4 reflex; Future  - TSH with free T4 reflex    Central chest discomfort likely due to costochondritis versus displaced rib.  He is going to work with chiropractor.  Would avoid NSAIDs due to uncontrolled reflux.            BMI  Estimated body mass index is 38.02 kg/m  as calculated from the following:    Height as of this encounter: 1.778 m (5' 10\").    Weight as of this encounter: 120.2 kg (265 lb).       Counseling  Appropriate preventive services were addressed with this patient via screening, questionnaire, or discussion as appropriate for fall prevention, nutrition, physical activity, Tobacco-use cessation, social engagement, weight loss and cognition.  Checklist reviewing preventive services available has been given to the patient.  Reviewed patient's diet, addressing concerns and/or questions.   The patient was instructed to see the dentist every 6 months.           Verna Quiñones is a 39 year old, presenting for the following:  Physical (Not fasting), Gastrophageal Reflux, and Possible dislocated rib           Healthy Habits:     Taking medications regularly:  0  History of Present Illness       Reason for visit:  Annual exam, GERD, possible dislocated rib   He is taking medications regularly.    Seen in clinic today for preventive care visit.  Noted to have blood pressure elevation today, it has been inconsistently elevated in the past as well.  He does not check this at home.  Denies exertional symptoms, chest pain, shortness of breath, edema.  Concerned that he may have dislocated his ribs he has had focal tenderness in his left mid costosternal region, sudden onset.  No associated shortness of breath, notes it tends to be significantly worse with a deep breath or cough, certain movements.  Feels like " a dislocated rib which she has had in the past but he has not been able to pop it back in.  History of reflux for several years, recalls having reflux as a child managed with ranitidine.  Reports that he frequently will taste acid and food in his throat, worse if he is over eaten, occurs consistently if he lays down after eating.  He has started omeprazole, found that the taste is less harsh and his daytime symptoms have improved somewhat but persist.  Rarely will have some dysphagia, it is not recurrent and he has not been regurgitating.  Notes that acidic foods like tomatoes make it worse.  No blood noted, no melena or hematochezia.  History of anxiety stable on sertraline.  History of sleep apnea, he is compliant with CPAP.  That he has not been exercising, somewhat limited by knee pain, chronic thoracic back pain following fracture as a teenager.        Health Care Directive  Patient does not have a Health Care Directive: Discussed advance care planning with patient; information given to patient to review.      12/11/2024   General Health   How would you rate your overall physical health? (!) FAIR   Feel stress (tense, anxious, or unable to sleep) Not at all            12/11/2024   Nutrition   Three or more servings of calcium each day? Yes   Diet: Regular (no restrictions)   How many servings of fruit and vegetables per day? (!) 0-1   How many sweetened beverages each day? 0-1            12/11/2024   Exercise   Days per week of moderate/strenous exercise 0 days      (!) EXERCISE CONCERN      12/11/2024   Social Factors   Frequency of gathering with friends or relatives Once a week   Worry food won't last until get money to buy more No   Food not last or not have enough money for food? No   Do you have housing? (Housing is defined as stable permanent housing and does not include staying ouside in a car, in a tent, in an abandoned building, in an overnight shelter, or couch-surfing.) Yes   Are you worried about  losing your housing? No   Lack of transportation? No   Unable to get utilities (heat,electricity)? No            12/11/2024   Dental   Dentist two times every year? (!) NO            11/29/2024   TB Screening   Were you born outside of the [place]? No              Today's PHQ-2 Score:       11/5/2024    12:22 PM   PHQ-2 ( 1999 [employer])   Q1: Little interest or pleasure in doing things 0   Q2: Feeling down, depressed or hopeless 0   PHQ-2 Score 0         12/11/2024   Substance Use   Alcohol more than 3/day or more than 7/wk No   Do you use any other substances recreationally? No        Social History     Tobacco Use    Smoking status: Never     Passive exposure: Never    Smokeless tobacco: Never   Vaping Use    Vaping status: Never Used   Substance Use Topics    Alcohol use: Yes     Comment: one/month    Drug use: Never           12/11/2024   STI Screening   New sexual partner(s) since last STI/HIV test? No      ASCVD Risk   The 10-year ASCVD risk score (Edie OLIVER, et al., 2019) is: 2.6%    Values used to calculate the score:      Age: 39 years      Sex: Male      Is Non- : No      Diabetic: No      Tobacco smoker: No      Systolic Blood Pressure: 152 mmHg      Is BP treated: No      HDL Cholesterol: 38 mg/dL      Total Cholesterol: 214 mg/dL       Reviewed and updated as needed this visit by Provider                    Past Medical History:   Diagnosis Date    Arthritis     Depression     Hypertension     Sleep apnea      Past Surgical History:   Procedure Laterality Date    ARTHROSCOPY KNEE Left     VASECTOMY       OB History   No obstetric history on file.     Lab work is in process  Labs reviewed in EPIC  BP Readings from Last 3 Encounters:   12/11/24 (!) 146/88   07/30/24 (!) 144/94   04/11/24 (!) 144/81    Wt Readings from Last 3 Encounters:   12/11/24 120.2 kg (265 lb)   09/24/24 124.7 kg (275 lb)   07/30/24 119.3 kg (263 lb)                  Patient Active Problem List   Diagnosis     Allergic rhinitis, unspecified seasonality, unspecified trigger    JOHN (obstructive sleep apnea)    Mixed hyperlipidemia    Class 2 severe obesity due to excess calories with serious comorbidity in adult (H)    Hearing difficulty, bilateral    Numbness of left hand    Patella, chondromalacia, left    Seasonal allergies    Allergic rhinitis due to animals    Allergic rhinitis due to dust mite     Past Surgical History:   Procedure Laterality Date    ARTHROSCOPY KNEE Left     VASECTOMY         Social History     Tobacco Use    Smoking status: Never     Passive exposure: Never    Smokeless tobacco: Never   Substance Use Topics    Alcohol use: Yes     Comment: one/month     Family History   Problem Relation Age of Onset    Hypertension Mother     Sleep Apnea Father     Diabetes Brother         type 1 diabetes    Diabetes Paternal Grandmother     Seizure Disorder Daughter          Current Outpatient Medications   Medication Sig Dispense Refill    albuterol (PROAIR HFA/PROVENTIL HFA/VENTOLIN HFA) 108 (90 Base) MCG/ACT inhaler Inhale 2 puffs into the lungs every 6 hours as needed for shortness of breath, wheezing or cough 18 g 1    azelastine (ASTELIN) 0.1 % nasal spray Spray 2 sprays into both nostrils 2 times daily 30 mL 3    cetirizine 10 mg TbDL [CETIRIZINE 10 MG TBDL] Take by mouth.      cyclobenzaprine (FLEXERIL) 10 MG tablet TAKE 1 TABLET BY MOUTH 3 TIMES DAILY AS NEEDED FOR MUSCLE SPASMS 30 tablet 0    hydrOXYzine jm (VISTARIL) 25 MG capsule Take 1 capsule (25 mg) by mouth 3 times daily as needed for anxiety 20 capsule 1    ketoconazole (NIZORAL) 2 % external shampoo [KETOCONAZOLE (NIZORAL) 2 % SHAMPOO] APPLY TO DAMP SKIN, LATHER, LEAVE ON 5 MINUTES, AND RINSE 120 mL 3    omeprazole (PRILOSEC) 20 MG DR capsule TAKE 1 CAPSULE BY MOUTH EVERY DAY 90 capsule 1    sertraline (ZOLOFT) 100 MG tablet Take 2 tablets (200 mg) by mouth daily 180 tablet 3    SUMAtriptan (IMITREX) 25 MG tablet Take 1 tablet (25 mg) by mouth  "every 2 hours as needed for migraine 10 tablet 3     Allergies   Allergen Reactions    Aspartame Headache     Causes migraines.     Recent Labs   Lab Test 12/11/24  1316 07/30/24  0805 08/23/23  1530 05/19/22  0851 04/20/21  1443 04/20/21  1443 12/24/19  0920 02/21/19  1555 02/21/19  1552   A1C 5.7*  --  5.4  --   --   --   --   --   --    LDL  --   --  138* 143*  --   --  129  --   --    HDL  --   --  38* 35*  --   --  39*  --   --    TRIG  --   --  192* 158*  --   --  146  --   --    ALT  --   --  50  --   --   --   --   --   --    CR  --  1.07 1.05 0.89  --  0.99  --    < > 0.98   GFRESTIMATED  --  >90 >90 >90   < > >60  --   --  >60   GFRESTBLACK  --   --   --   --   --  >60  --   --  >60   POTASSIUM  --  4.1 4.2 4.2  --  4.3  --   --  3.9   TSH  --   --  1.20  --   --  0.88  --   --   --     < > = values in this interval not displayed.          Review of Systems  Constitutional, neuro, ENT, endocrine, pulmonary, cardiac, gastrointestinal, genitourinary, musculoskeletal, integument and psychiatric systems are negative, except as otherwise noted.     Objective    Exam  BP (!) 152/90   Pulse 75   Temp 98  F (36.7  C) (Oral)   Resp 18   Ht 1.778 m (5' 10\")   Wt 120.2 kg (265 lb)   SpO2 97%   BMI 38.02 kg/m     Estimated body mass index is 38.02 kg/m  as calculated from the following:    Height as of this encounter: 1.778 m (5' 10\").    Weight as of this encounter: 120.2 kg (265 lb).    Physical Exam  GENERAL: alert and no distress  EYES: Eyes grossly normal to inspection, PERRL and conjunctivae and sclerae normal  HENT: ear canals and TM's normal, nose and mouth without ulcers or lesions  NECK: no adenopathy, no asymmetry, masses, or scars  RESP: lungs clear to auscultation - no rales, rhonchi or wheezes  CV: regular rate and rhythm, normal S1 S2, no S3 or S4, no murmur, click or rub, no peripheral edema  ABDOMEN: soft, nontender, no hepatosplenomegaly, no masses and bowel sounds normal  MS: no gross " musculoskeletal defects noted, no edema  SKIN: no suspicious lesions or rashes  NEURO: Normal strength and tone, mentation intact and speech normal  PSYCH: mentation appears normal, affect normal/bright        Signed Electronically by: Charlotte Edgar MD

## 2024-12-12 LAB
ALBUMIN SERPL BCG-MCNC: 4.4 G/DL (ref 3.5–5.2)
ALP SERPL-CCNC: 134 U/L (ref 40–150)
ALT SERPL W P-5'-P-CCNC: 55 U/L (ref 0–70)
ANION GAP SERPL CALCULATED.3IONS-SCNC: 12 MMOL/L (ref 7–15)
AST SERPL W P-5'-P-CCNC: 33 U/L (ref 0–45)
BILIRUB SERPL-MCNC: 0.3 MG/DL
BUN SERPL-MCNC: 14.1 MG/DL (ref 6–20)
CALCIUM SERPL-MCNC: 9.6 MG/DL (ref 8.8–10.4)
CHLORIDE SERPL-SCNC: 103 MMOL/L (ref 98–107)
CHOLEST SERPL-MCNC: 221 MG/DL
CREAT SERPL-MCNC: 0.97 MG/DL (ref 0.67–1.17)
EGFRCR SERPLBLD CKD-EPI 2021: >90 ML/MIN/1.73M2
FASTING STATUS PATIENT QL REPORTED: NO
FASTING STATUS PATIENT QL REPORTED: NO
GLUCOSE SERPL-MCNC: 94 MG/DL (ref 70–99)
HCO3 SERPL-SCNC: 25 MMOL/L (ref 22–29)
HDLC SERPL-MCNC: 29 MG/DL
LDLC SERPL CALC-MCNC: ABNORMAL MG/DL
LDLC SERPL DIRECT ASSAY-MCNC: 87 MG/DL
NONHDLC SERPL-MCNC: 192 MG/DL
POTASSIUM SERPL-SCNC: 4.5 MMOL/L (ref 3.4–5.3)
PROT SERPL-MCNC: 7.3 G/DL (ref 6.4–8.3)
SODIUM SERPL-SCNC: 140 MMOL/L (ref 135–145)
TRIGL SERPL-MCNC: 600 MG/DL
TSH SERPL DL<=0.005 MIU/L-ACNC: 1.06 UIU/ML (ref 0.3–4.2)

## 2024-12-19 DIAGNOSIS — K21.9 GASTROESOPHAGEAL REFLUX DISEASE, UNSPECIFIED WHETHER ESOPHAGITIS PRESENT: ICD-10-CM

## 2025-01-31 ENCOUNTER — TRANSFERRED RECORDS (OUTPATIENT)
Dept: HEALTH INFORMATION MANAGEMENT | Facility: CLINIC | Age: 40
End: 2025-01-31
Payer: COMMERCIAL

## 2025-07-15 ENCOUNTER — TELEPHONE (OUTPATIENT)
Dept: UROLOGY | Facility: CLINIC | Age: 40
End: 2025-07-15
Payer: COMMERCIAL

## 2025-08-04 DIAGNOSIS — F41.1 GENERALIZED ANXIETY DISORDER: ICD-10-CM

## 2025-08-04 RX ORDER — SERTRALINE HYDROCHLORIDE 100 MG/1
200 TABLET, FILM COATED ORAL DAILY
Qty: 180 TABLET | Refills: 3 | Status: SHIPPED | OUTPATIENT
Start: 2025-08-04

## 2025-08-18 PROBLEM — R12 HEARTBURN: Status: ACTIVE | Noted: 2025-02-04

## 2025-08-18 PROBLEM — K21.9 GASTROESOPHAGEAL REFLUX DISEASE WITHOUT ESOPHAGITIS: Status: ACTIVE | Noted: 2025-01-31

## 2025-08-21 SDOH — HEALTH STABILITY: PHYSICAL HEALTH: ON AVERAGE, HOW MANY MINUTES DO YOU ENGAGE IN EXERCISE AT THIS LEVEL?: 10 MIN

## 2025-08-21 SDOH — HEALTH STABILITY: PHYSICAL HEALTH: ON AVERAGE, HOW MANY DAYS PER WEEK DO YOU ENGAGE IN MODERATE TO STRENUOUS EXERCISE (LIKE A BRISK WALK)?: 5 DAYS

## 2025-08-21 ASSESSMENT — ASTHMA QUESTIONNAIRES
QUESTION_1 LAST FOUR WEEKS HOW MUCH OF THE TIME DID YOUR ASTHMA KEEP YOU FROM GETTING AS MUCH DONE AT WORK, SCHOOL OR AT HOME: NONE OF THE TIME
QUESTION_3 LAST FOUR WEEKS HOW OFTEN DID YOUR ASTHMA SYMPTOMS (WHEEZING, COUGHING, SHORTNESS OF BREATH, CHEST TIGHTNESS OR PAIN) WAKE YOU UP AT NIGHT OR EARLIER THAN USUAL IN THE MORNING: NOT AT ALL
ACT_TOTALSCORE: 24
QUESTION_4 LAST FOUR WEEKS HOW OFTEN HAVE YOU USED YOUR RESCUE INHALER OR NEBULIZER MEDICATION (SUCH AS ALBUTEROL): NOT AT ALL
QUESTION_2 LAST FOUR WEEKS HOW OFTEN HAVE YOU HAD SHORTNESS OF BREATH: ONCE OR TWICE A WEEK
QUESTION_5 LAST FOUR WEEKS HOW WOULD YOU RATE YOUR ASTHMA CONTROL: COMPLETELY CONTROLLED

## 2025-08-26 ENCOUNTER — OFFICE VISIT (OUTPATIENT)
Dept: FAMILY MEDICINE | Facility: CLINIC | Age: 40
End: 2025-08-26
Payer: COMMERCIAL

## 2025-08-26 VITALS
DIASTOLIC BLOOD PRESSURE: 94 MMHG | WEIGHT: 277 LBS | OXYGEN SATURATION: 95 % | HEART RATE: 84 BPM | TEMPERATURE: 97.6 F | RESPIRATION RATE: 18 BRPM | HEIGHT: 70 IN | SYSTOLIC BLOOD PRESSURE: 139 MMHG | BODY MASS INDEX: 39.65 KG/M2

## 2025-08-26 DIAGNOSIS — M79.18 MYOFASCIAL PAIN: ICD-10-CM

## 2025-08-26 DIAGNOSIS — J30.81 ALLERGIC RHINITIS DUE TO ANIMALS: ICD-10-CM

## 2025-08-26 DIAGNOSIS — E66.812 CLASS 2 SEVERE OBESITY WITH SERIOUS COMORBIDITY AND BODY MASS INDEX (BMI) OF 39.0 TO 39.9 IN ADULT, UNSPECIFIED OBESITY TYPE (H): ICD-10-CM

## 2025-08-26 DIAGNOSIS — Z79.899 MEDICATION MANAGEMENT: ICD-10-CM

## 2025-08-26 DIAGNOSIS — I10 BENIGN ESSENTIAL HYPERTENSION: ICD-10-CM

## 2025-08-26 DIAGNOSIS — L21.9 SEBORRHEIC DERMATITIS: ICD-10-CM

## 2025-08-26 DIAGNOSIS — N20.0 NEPHROLITHIASIS: ICD-10-CM

## 2025-08-26 DIAGNOSIS — R73.03 PREDIABETES: ICD-10-CM

## 2025-08-26 DIAGNOSIS — J30.89 ALLERGIC RHINITIS DUE TO DUST MITE: ICD-10-CM

## 2025-08-26 DIAGNOSIS — J30.1 SEASONAL ALLERGIC RHINITIS DUE TO POLLEN: ICD-10-CM

## 2025-08-26 DIAGNOSIS — Z12.5 SCREENING FOR PROSTATE CANCER: ICD-10-CM

## 2025-08-26 DIAGNOSIS — Z00.00 ENCOUNTER FOR ROUTINE HISTORY AND PHYSICAL EXAM FOR MALE: Primary | ICD-10-CM

## 2025-08-26 DIAGNOSIS — F41.1 GENERALIZED ANXIETY DISORDER: ICD-10-CM

## 2025-08-26 DIAGNOSIS — E66.01 CLASS 2 SEVERE OBESITY WITH SERIOUS COMORBIDITY AND BODY MASS INDEX (BMI) OF 39.0 TO 39.9 IN ADULT, UNSPECIFIED OBESITY TYPE (H): ICD-10-CM

## 2025-08-26 DIAGNOSIS — K21.9 GASTROESOPHAGEAL REFLUX DISEASE, UNSPECIFIED WHETHER ESOPHAGITIS PRESENT: ICD-10-CM

## 2025-08-26 DIAGNOSIS — G44.82 ORGASMIC HEADACHE: ICD-10-CM

## 2025-08-26 DIAGNOSIS — E78.2 MIXED HYPERLIPIDEMIA: ICD-10-CM

## 2025-08-26 LAB
ALBUMIN UR-MCNC: NEGATIVE MG/DL
APPEARANCE UR: CLEAR
BILIRUB UR QL STRIP: NEGATIVE
COLOR UR AUTO: YELLOW
ERYTHROCYTE [DISTWIDTH] IN BLOOD BY AUTOMATED COUNT: 13.4 % (ref 10–15)
EST. AVERAGE GLUCOSE BLD GHB EST-MCNC: 120 MG/DL
GLUCOSE UR STRIP-MCNC: NEGATIVE MG/DL
HBA1C MFR BLD: 5.8 % (ref 0–5.6)
HCT VFR BLD AUTO: 43.3 % (ref 40–53)
HGB BLD-MCNC: 14.5 G/DL (ref 13.3–17.7)
HGB UR QL STRIP: NEGATIVE
KETONES UR STRIP-MCNC: NEGATIVE MG/DL
LEUKOCYTE ESTERASE UR QL STRIP: NEGATIVE
MCH RBC QN AUTO: 29.1 PG (ref 26.5–33)
MCHC RBC AUTO-ENTMCNC: 33.5 G/DL (ref 31.5–36.5)
MCV RBC AUTO: 86.9 FL (ref 78–100)
NITRATE UR QL: NEGATIVE
PH UR STRIP: 5.5 [PH] (ref 5–8)
PLATELET # BLD AUTO: 312 10E3/UL (ref 150–450)
RBC # BLD AUTO: 4.98 10E6/UL (ref 4.4–5.9)
SP GR UR STRIP: 1.02 (ref 1–1.03)
UROBILINOGEN UR STRIP-ACNC: 1 E.U./DL
WBC # BLD AUTO: 10.5 10E3/UL (ref 4–11)

## 2025-08-26 PROCEDURE — 36415 COLL VENOUS BLD VENIPUNCTURE: CPT | Performed by: NURSE PRACTITIONER

## 2025-08-26 PROCEDURE — 90480 ADMN SARSCOV2 VAC 1/ONLY CMP: CPT | Performed by: NURSE PRACTITIONER

## 2025-08-26 PROCEDURE — 91320 SARSCV2 VAC 30MCG TRS-SUC IM: CPT | Performed by: NURSE PRACTITIONER

## 2025-08-26 PROCEDURE — 99214 OFFICE O/P EST MOD 30 MIN: CPT | Mod: 25 | Performed by: NURSE PRACTITIONER

## 2025-08-26 PROCEDURE — 3075F SYST BP GE 130 - 139MM HG: CPT | Performed by: NURSE PRACTITIONER

## 2025-08-26 PROCEDURE — 90677 PCV20 VACCINE IM: CPT | Performed by: NURSE PRACTITIONER

## 2025-08-26 PROCEDURE — 84443 ASSAY THYROID STIM HORMONE: CPT | Performed by: NURSE PRACTITIONER

## 2025-08-26 PROCEDURE — 80061 LIPID PANEL: CPT | Performed by: NURSE PRACTITIONER

## 2025-08-26 PROCEDURE — 81003 URINALYSIS AUTO W/O SCOPE: CPT | Performed by: NURSE PRACTITIONER

## 2025-08-26 PROCEDURE — 3080F DIAST BP >= 90 MM HG: CPT | Performed by: NURSE PRACTITIONER

## 2025-08-26 PROCEDURE — G0103 PSA SCREENING: HCPCS | Performed by: NURSE PRACTITIONER

## 2025-08-26 PROCEDURE — 3044F HG A1C LEVEL LT 7.0%: CPT | Performed by: NURSE PRACTITIONER

## 2025-08-26 PROCEDURE — 85027 COMPLETE CBC AUTOMATED: CPT | Performed by: NURSE PRACTITIONER

## 2025-08-26 PROCEDURE — 80053 COMPREHEN METABOLIC PANEL: CPT | Performed by: NURSE PRACTITIONER

## 2025-08-26 PROCEDURE — 1125F AMNT PAIN NOTED PAIN PRSNT: CPT | Performed by: NURSE PRACTITIONER

## 2025-08-26 PROCEDURE — 83036 HEMOGLOBIN GLYCOSYLATED A1C: CPT | Performed by: NURSE PRACTITIONER

## 2025-08-26 PROCEDURE — G2211 COMPLEX E/M VISIT ADD ON: HCPCS | Performed by: NURSE PRACTITIONER

## 2025-08-26 PROCEDURE — 99396 PREV VISIT EST AGE 40-64: CPT | Mod: 25 | Performed by: NURSE PRACTITIONER

## 2025-08-26 PROCEDURE — 90471 IMMUNIZATION ADMIN: CPT | Performed by: NURSE PRACTITIONER

## 2025-08-26 RX ORDER — HYDROCHLOROTHIAZIDE 12.5 MG/1
12.5 TABLET ORAL DAILY
Qty: 30 TABLET | Refills: 0 | Status: SHIPPED | OUTPATIENT
Start: 2025-08-26

## 2025-08-26 RX ORDER — CYCLOBENZAPRINE HCL 10 MG
10 TABLET ORAL 3 TIMES DAILY PRN
Qty: 30 TABLET | Refills: 1 | Status: SHIPPED | OUTPATIENT
Start: 2025-08-26

## 2025-08-26 RX ORDER — OMEPRAZOLE 20 MG/1
20 CAPSULE, DELAYED RELEASE ORAL DAILY
Qty: 90 CAPSULE | Refills: 4 | Status: SHIPPED | OUTPATIENT
Start: 2025-08-26

## 2025-08-26 RX ORDER — HYDROXYZINE PAMOATE 25 MG/1
25 CAPSULE ORAL 3 TIMES DAILY PRN
Qty: 20 CAPSULE | Refills: 1 | Status: SHIPPED | OUTPATIENT
Start: 2025-08-26

## 2025-08-26 RX ORDER — SUMATRIPTAN SUCCINATE 25 MG/1
25 TABLET ORAL
Qty: 10 TABLET | Refills: 3 | Status: SHIPPED | OUTPATIENT
Start: 2025-08-26

## 2025-08-26 RX ORDER — SERTRALINE HYDROCHLORIDE 100 MG/1
200 TABLET, FILM COATED ORAL DAILY
Qty: 180 TABLET | Refills: 3 | Status: SHIPPED | OUTPATIENT
Start: 2025-08-26

## 2025-08-26 RX ORDER — KETOCONAZOLE 20 MG/ML
SHAMPOO, SUSPENSION TOPICAL
Qty: 120 ML | Refills: 3 | Status: SHIPPED | OUTPATIENT
Start: 2025-08-26

## 2025-08-26 ASSESSMENT — PAIN SCALES - GENERAL: PAINLEVEL_OUTOF10: MILD PAIN (2)

## 2025-08-27 LAB
ALBUMIN SERPL BCG-MCNC: 4.7 G/DL (ref 3.5–5.2)
ALP SERPL-CCNC: 145 U/L (ref 40–150)
ALT SERPL W P-5'-P-CCNC: 80 U/L (ref 0–70)
ANION GAP SERPL CALCULATED.3IONS-SCNC: 16 MMOL/L (ref 7–15)
AST SERPL W P-5'-P-CCNC: 54 U/L (ref 0–45)
BILIRUB SERPL-MCNC: 0.5 MG/DL
BUN SERPL-MCNC: 10.3 MG/DL (ref 6–20)
CALCIUM SERPL-MCNC: 9.8 MG/DL (ref 8.8–10.4)
CHLORIDE SERPL-SCNC: 100 MMOL/L (ref 98–107)
CHOLEST SERPL-MCNC: 250 MG/DL
CREAT SERPL-MCNC: 1.06 MG/DL (ref 0.67–1.17)
EGFRCR SERPLBLD CKD-EPI 2021: 90 ML/MIN/1.73M2
FASTING STATUS PATIENT QL REPORTED: ABNORMAL
FASTING STATUS PATIENT QL REPORTED: ABNORMAL
GLUCOSE SERPL-MCNC: 92 MG/DL (ref 70–99)
HCO3 SERPL-SCNC: 25 MMOL/L (ref 22–29)
HDLC SERPL-MCNC: 33 MG/DL
LDLC SERPL CALC-MCNC: 157 MG/DL
NONHDLC SERPL-MCNC: 217 MG/DL
POTASSIUM SERPL-SCNC: 4.1 MMOL/L (ref 3.4–5.3)
PROT SERPL-MCNC: 7.5 G/DL (ref 6.4–8.3)
PSA SERPL DL<=0.01 NG/ML-MCNC: 0.48 NG/ML (ref 0–2.5)
SODIUM SERPL-SCNC: 141 MMOL/L (ref 135–145)
TRIGL SERPL-MCNC: 299 MG/DL
TSH SERPL DL<=0.005 MIU/L-ACNC: 1.5 UIU/ML (ref 0.3–4.2)